# Patient Record
Sex: FEMALE | Race: WHITE | NOT HISPANIC OR LATINO | Employment: OTHER | URBAN - METROPOLITAN AREA
[De-identification: names, ages, dates, MRNs, and addresses within clinical notes are randomized per-mention and may not be internally consistent; named-entity substitution may affect disease eponyms.]

---

## 2018-12-31 ENCOUNTER — TELEPHONE (OUTPATIENT)
Dept: SURGERY | Facility: CLINIC | Age: 68
End: 2018-12-31

## 2018-12-31 DIAGNOSIS — Z86.0101 HX OF ADENOMATOUS POLYP OF COLON: Primary | ICD-10-CM

## 2018-12-31 NOTE — TELEPHONE ENCOUNTER
[facility] Call Center    Phone Message    May a detailed message be left on voicemail: yes    Reason for Call: Order(s): Other:   Reason for requested: Colonoscopy; last seen 11/2013  Date needed: Next available  Provider name: Dr. Amadou Barragan      Action Taken: Message routed to:  [facility]

## 2018-12-31 NOTE — TELEPHONE ENCOUNTER
Called patient regarding message below. Patient was not available, spoke to spouse Rosalio. Patient last colonoscopy 6/2013 with Dr. Barragan, recommendation to follow up 3 years. Patient is over due. Orders place in [facility]. Someone will call patient to schedule Colonoscopy.    Enoch RUBALCAVA LPN

## 2019-01-21 ENCOUNTER — TELEPHONE (OUTPATIENT)
Dept: GASTROENTEROLOGY | Facility: OUTPATIENT CENTER | Age: 69
End: 2019-01-21

## 2019-01-22 ENCOUNTER — TELEPHONE (OUTPATIENT)
Dept: GASTROENTEROLOGY | Facility: OUTPATIENT CENTER | Age: 69
End: 2019-01-22

## 2019-01-22 NOTE — TELEPHONE ENCOUNTER
Patient taking any blood thinners ? 81 mg aspirin    Heart disease ? Denies     Lung disease ? Denies       Sleep apnea ? Denies     Diabetic ? Denies     Kidney disease ? Denies     Dialysis ? N/a     Electronic implanted medical devices ? Denies     Are you taking any narcotic pain medication ?  No  What is your daily dosage ?    PTSD ? N/a     Prep instructions reviewed with patient ? Instructions, policy,MAC sedation plan reviewed. Advised patient to have someone stay with them post exam.    Pharmacy :    Indication for procedure :Hx of adenomatous polyp of colon [Z86.010    Referring provider : Self     Arrival Time : 2:30 PM

## 2019-01-28 ENCOUNTER — DOCUMENTATION ONLY (OUTPATIENT)
Dept: GASTROENTEROLOGY | Facility: OUTPATIENT CENTER | Age: 69
End: 2019-01-28
Payer: MEDICARE

## 2019-01-28 ENCOUNTER — TRANSFERRED RECORDS (OUTPATIENT)
Dept: HEALTH INFORMATION MANAGEMENT | Facility: CLINIC | Age: 69
End: 2019-01-28

## 2019-01-29 ENCOUNTER — TELEPHONE (OUTPATIENT)
Dept: GASTROENTEROLOGY | Facility: CLINIC | Age: 69
End: 2019-01-29

## 2019-02-25 ENCOUNTER — TELEPHONE (OUTPATIENT)
Dept: GASTROENTEROLOGY | Facility: OUTPATIENT CENTER | Age: 69
End: 2019-02-25

## 2019-03-04 ENCOUNTER — DOCUMENTATION ONLY (OUTPATIENT)
Dept: GASTROENTEROLOGY | Facility: OUTPATIENT CENTER | Age: 69
End: 2019-03-04
Payer: MEDICARE

## 2019-03-04 ENCOUNTER — TRANSFERRED RECORDS (OUTPATIENT)
Dept: HEALTH INFORMATION MANAGEMENT | Facility: CLINIC | Age: 69
End: 2019-03-04

## 2022-02-25 ENCOUNTER — TRANSCRIBE ORDERS (OUTPATIENT)
Dept: MULTI SPECIALTY CLINIC | Facility: CLINIC | Age: 72
End: 2022-02-25
Payer: MEDICARE

## 2022-02-25 DIAGNOSIS — M35.00 SJOGREN'S SYNDROME, WITH UNSPECIFIED ORGAN INVOLVEMENT (H): ICD-10-CM

## 2022-02-25 DIAGNOSIS — M35.1 MIXED CONNECTIVE TISSUE DISEASE (H): Primary | ICD-10-CM

## 2022-02-28 ENCOUNTER — TELEPHONE (OUTPATIENT)
Dept: MULTI SPECIALTY CLINIC | Facility: CLINIC | Age: 72
End: 2022-02-28
Payer: MEDICARE

## 2022-02-28 NOTE — TELEPHONE ENCOUNTER
[facility] Call Center    Phone Message    May a detailed message be left on voicemail: no     Reason for Call: Appointment Intake    Referring Provider Name: Dr. Manuelito Simpson  Diagnosis and/or Symptoms: Mixed connective tissue disease, Sjogren's syndrome, with unspecified organ involvement    Appt scheduled for 4/8, pt's  is hoping there's some way she could be seen earlier? He reports she has been experiencing severe pain-widespread across several areas of her body (hands are purple, feet are red/purple). Has not been able to sleep well,  reports that she has no quality of life. Says she has had this pain for some time, but recently it has worsened to the point of becoming unbearable.   Is ok with being seen by any provider, ok for either video or in-person visit.     Action Taken: Message routed to:  [facility]    Travel Screening: Not Applicable

## 2022-03-03 NOTE — TELEPHONE ENCOUNTER
Pt was given diagnosis of Sjogren's syndrome and UCTD in 2016 was seen in [facility] clinic, and by [facility].     Pt requesting to be seen again due to increasing pain in hands and feet.      Natalia Luna RN  Rheumatology Clinic

## 2022-03-08 NOTE — TELEPHONE ENCOUNTER
NOTES Status Details   OFFICE NOTE from referring provider Internal 02.11.2022 Manuelito Simpson MD     MEDICATION LIST Care Everywhere    LABS (Any and all labs)      Care Everywhere

## 2022-03-10 NOTE — TELEPHONE ENCOUNTER
"Called \"home\" and was unable to leave VM. Left VM on \"mobile\" to call back and inform patient of response from clinical staff.   "

## 2022-03-11 NOTE — TELEPHONE ENCOUNTER
Pt's spouse called yesterday evening afterhour and left a VM in Rheum scheduling VM.  Writer return the call and LMTCB to relay message below from [facility] Staff.    Natalia Luna, RN  Scheduling Adult Rheumatology        Pt can keep virtual appt, no one has anything sooner.

## 2022-04-08 ENCOUNTER — VIRTUAL VISIT (OUTPATIENT)
Dept: RHEUMATOLOGY | Facility: CLINIC | Age: 72
End: 2022-04-08
Attending: INTERNAL MEDICINE
Payer: MEDICARE

## 2022-04-08 ENCOUNTER — PRE VISIT (OUTPATIENT)
Dept: RHEUMATOLOGY | Facility: CLINIC | Age: 72
End: 2022-04-08
Payer: MEDICARE

## 2022-04-08 DIAGNOSIS — M35.00 SJOGREN'S SYNDROME, WITH UNSPECIFIED ORGAN INVOLVEMENT (H): Primary | ICD-10-CM

## 2022-04-08 DIAGNOSIS — M35.1 MIXED CONNECTIVE TISSUE DISEASE (H): ICD-10-CM

## 2022-04-08 DIAGNOSIS — Z11.59 ENCOUNTER FOR SCREENING FOR OTHER VIRAL DISEASES: ICD-10-CM

## 2022-04-08 PROCEDURE — 99205 OFFICE O/P NEW HI 60 MIN: CPT | Mod: 95 | Performed by: INTERNAL MEDICINE

## 2022-04-08 NOTE — PROGRESS NOTES
Outpatient Rheumatology Consultation  This visit was conducted via synchronous video visit due to the current COVID-19 crisis to reduce patient risk.  Verbal consent was obtained and is documented below.    Name: Anjali Chery    MRN [id number]   Today's date: 4/8/2022         Reason for consult: History of sjogrens syndrome and painful rash   Requesting physician: Manuelito Simpson             Assessment & Plan:   #DANIELA by IFA negative on 4/19/22 though previously DANIELA by EIA of 6.9 on 6/24/2015  #SSA >8 on 4/19/22 and >240 on 9/16/2021  #Severe dry mouth with gingival disease/carries/cracked teeth  #Synovitis/tenosynovitis of right wrist/hand 2014  #Progressive/severe pain with electrical shock sensation over palms/soles which has progressed to any area making contact with another surface    Very pleasant 72 year old female last seen in rheumatology in 2016 by Ubaldo Rojas/Calvin at that time for what was thought to be most consistent with sjogrens (had positive DANIELA by EIA of 6.9 then). Had an skin biopsy with epidermal nerve fiber density performed on 11/9/2015 which was 11.4 (which was mildly below the 5% cut off of 12.2 and so thought to be inconclusive at that time).  While some had been checked previously and negative, with this encounter I ordered ESR, CRP, CBC, CMP, SPEP, rheumatoid factor, CCP, SSB, Fajardo, RNP, cryoglobulins, TPO antibody, MPO, MS-3, hepatitis B/C/HIV/Quant gold, angiotensin-converting enzyme, TSH, CK, IgA levels, IgG levels, IgM level all of which were negative.  Her only abnormal serology on labs ordered with this encounter was her elevated SSA antibody above 8.  Her ongoing progressive symptoms since her last visit with rheumatology is that of her severe dry mouth with abrupt progression with gingival disease/caries/cracked teeth and her biggest complaint being that of her electrical shock sensation associated with red rash on the palms and soles. Taken together, I do wonder  whether her biggest complain of skin pain/paresthesias etc is related to a small fiber neuropathy (I do note that the last time she had an EMG done was in 2015 and the records seem to have jammed when scanned so the pages are overlapped. From what I can read, apart from carpal tunnel, there is an early neuropathy of the lower extremities- but unable to make out much more)related to sjogrens, though I have not seen this manifestation before. We Have asked for the expertise of dermatology evaluation who may decide on another bx if they feel is appropriate given that at the time of her last biopsy her symptoms were far less severe/pronounced. If consistent with small fiber neuropathy, would likely recommend IVIG to the patient.     Chilango Dick MD  Rheumatology     I spent a total of 75 minutes on the date of service on chart review, patient video encounter, documentation, .     Subjective:   Biggest complaint at this time is ongoing shocking/electrical sensation. 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 Occurred in both hands. Waited for a few years to have carpal tunnel release and had lasting damage after release to median nerve. Post surgical period complicated by ongoing drainage.     Her current symptoms are that of pain/pressure in her digits. With feet on the floor and sitting, her hand and foot symptoms are worse. When standing and walking, can be either equal or the same. She has the sensation that she is being shocked or exposed to electricity. Previously when lying down, will put her feet up/ put arms up in the air and would be able to  Reduce symptoms. She has the sensation that she is being burned by the sun/radiation.     Severe dry mouth. Worsens with acute worsening of her dry mouth with her acute worsening of her foot/hand symptoms. Has started to crack/broken teeth. Prior to 2018 had excellent dental care. Then abprupt worsening of gingival disease and carries. No  history of symptoms consistent of raynauds. No chest pain/shortness of breath. Has been walking 1.5-3 miles without chest/breathing issues. Once a week in summer and winter, will have some dried blood in tissue paper when blowing her nose. No fevers/chills/night sweats. Is vaccinated against COVID. Weight has been stable. No abdominal pain/nausea/diarrhea. No blood in her stool. No change in urinary symptoms.  Had previously been tried on methotrexate along with prednisone.  Did not notice any improvement and so discontinued both prior to [facility] with Ubaldo Rojas/Calvin.  No malar rash.  Other than the rash on her palms and soles and other pressure areas she denies rash elsewhere.  No dysphagia.  No change in vision or hearing.  Has residual bilateral median neuropathy.  No history of DVT or PE.  No history of inflammatory eye disease.  No history of oral or genital ulcers.  No history of symptoms consistent with dactylitis.  No history of symptoms consistent with sclerodactyly.  A plan at the last time of her appointment with rheumatology here was to start hydroxychloroquine though she did not continue on this due to developing nausea.  Her review of systems is otherwise negative.    Past Medical History  Past Medical History:   Diagnosis Date     Back disorder      History of colonic polyps      Hyperlipidemia      Median neuropathy, 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     Peripheral vascular disease (H)      Rectal prolapse      S/P bilateral vein stripping (1975) 9/2/2015     Sjogren's syndrome (H) 6/24/2015     Venous insufficiency 9/2/2015       Past Surgical History  Past Surgical History:   Procedure Laterality Date     APPENDECTOMY       CHOLECYSTECTOMY       COLECTOMY       COLONOSCOPY      multiple     rectal prolapse repair[       rectocele/enterocele repair[       RECTOSIGMOIDECTOMY PERINEAL  11/13/2013    Procedure: RECTOSIGMOIDECTOMY PERINEAL;  [facility] Procedure, Exam under anesthesia;   Surgeon: Amadou Barragan MD;  Location: [facility] OR     removal of tubes and ovaries[       sling procedure[       tumor removed right ilium[       vaginal prolapse repair[         Medications  Current Outpatient Medications   Medication     AMITRIPTYLINE HCL PO     aspirin 81 MG tablet     baclofen (LIORESAL) 20 MG tablet     butalbital-acetaminophen-caffeine (FIORICET, ESGIC) -40 MG per tablet     Calcium-Magnesium-Zinc 333-133-5 MG TABS     Cholecalciferol (VITAMIN D3 PO)     Coenzyme Q10 (CO Q 10 PO)     COMPRESSION STOCKINGS     Cyanocobalamin (B-12 PO)     cyclobenzaprine (FLEXERIL) 10 MG tablet     fish oil-omega-3 fatty acids (FISH OIL) 1000 MG capsule     Flaxseed, Linseed, (FLAXSEED OIL PO)     folic acid (FOLVITE) 400 MCG tablet     GARLIC PO     Lutein 20 MG CAPS     senna-docusate (SENOKOT-S;PERICOLACE) 8.6-50 MG per tablet     UNABLE TO FIND     zolpidem (AMBIEN) 10 MG tablet     No current facility-administered medications for this visit.       Allergies   Allergies   Allergen Reactions     Plaquenil [Hydroxychloroquine] Nausea       Family History: Has a thyroid disease in her family.  Brother with a history of eosinophilic fasciitis.    Social History: .  No history of tobacco use or alcohol use.  Rare if ever alcohol use.      Objective:   Physical exam:   No vitals for this video visit. Vitals reviewed in Epic.  GEN: Sitting up at table. NAD, her  is with her  HEENT: no facial rash, sclera clear, no inflammatory nose/ external ear changes  CV: no upper extremity dependent edema  Pulm: speaking in full sentences, no cough, no audible wheezing, no use of accessory muscles  Skin: Red, what appears to be thick, skin changes over her palms and soles.  Also over the extensor surface of her distal elbows extending into her forearms.  MSK: full active ROM of bilateral shoulders, elbows, wrists, MCPs, PIPs, DIPs. Can make full fist though does not look particularly tight.  No obvious  dactylitis.  No obvious redness or swelling of her MCPs or PIPs though difficult to assess with the redness over her palms and digits on the palmar aspect    Labs:  WBC   Date Value Ref Range Status   2016 7.9 4.0 - 11.0 10e9/L Final     Hemoglobin   Date Value Ref Range Status   2016 11.9 11.7 - 15.7 g/dL Final     Platelet Count   Date Value Ref Range Status   2016 206 150 - 450 10e9/L Final     Creatinine   Date Value Ref Range Status   2016 0.74 0.52 - 1.04 mg/dL Final     No results found for: ALKPHOS  No results found for: AST  No results found for: ALT  Sed Rate   Date Value Ref Range Status   2016 93 (H) 0 - 30 mm/h Final     CRP Inflammation   Date Value Ref Range Status   2016 <2.9 0.0 - 8.0 mg/L Final     UA RESULTS:  No results for input(s): COLOR, APPEARANCE, URINEGLC, URINEBILI, URINEKETONE, SG, UBLD, URINEPH, PROTEIN, UROBILINOGEN, NITRITE, LEUKEST, RBCU, WBCU in the last 32613 hours.   DANIELA Screen by EIA   Date Value Ref Range Status   2015 6.9 (H) <1.0 Final     Comment:     Interpretation:  Positive     DNA-ds   Date Value Ref Range Status   10/09/2015 <15  Interpretation:  Negative   0 - 29 IU/mL Final     RNP Antibody IgG   Date Value Ref Range Status   10/09/2015  0.0 - 0.9 AI Final    <0.2  Negative   Antibody index (AI) values reflect qualitative changes in antibody   concentration that cannot be directly associated with clinical condition or   disease state.       Rheumatoid Factor   Date Value Ref Range Status   2015 <20 <20 IU/mL Final       Imagin/10/2014: MRI right hand and wrist: Extensive synovitis and tenosynovitis

## 2022-04-08 NOTE — LETTER
4/8/2022       RE: Anjali Chery  [address] [place] [address]     Dear Colleague,    Thank you for referring your patient, Anjali Chery, to the [facility] RHEUMATOLOG[facility] [place] [facility]. Please see a copy of my visit note below.       Outpatient Rheumatology Consultation  This visit was conducted via synchronous video visit due to the current COVID-19 crisis to reduce patient risk.  Verbal consent was obtained and is documented below.    Name: Anjali Chery    MRN [id number]   Today's date: 4/8/2022         Reason for consult: History of sjogrens syndrome and painful rash   Requesting physician: Manuelito Simpson             Assessment & Plan:   #DANIELA by IFA negative on 4/19/22 though previously DANIELA by EIA of 6.9 on 6/24/2015  #SSA >8 on 4/19/22 and >240 on 9/16/2021  #Severe dry mouth with gingival disease/carries/cracked teeth  #Synovitis/tenosynovitis of right wrist/hand 2014  #Progressive/severe pain with electrical shock sensation over palms/soles which has progressed to any area making contact with another surface    Very pleasant 72 year old female last seen in rheumatology in 2016 by Ubaldo Rojas/Calvin at that time for what was thought to be most consistent with sjogrens (had positive DANIELA by EIA of 6.9 then). Had an skin biopsy with epidermal nerve fiber density performed on 11/9/2015 which was 11.4 (which was mildly below the 5% cut off of 12.2 and so thought to be inconclusive at that time).  While some had been checked previously and negative, with this encounter I ordered ESR, CRP, CBC, CMP, SPEP, rheumatoid factor, CCP, SSB, Fajardo, RNP, cryoglobulins, TPO antibody, MPO, [place]-3, hepatitis B/C/HIV/Quant gold, angiotensin-converting enzyme, TSH, CK, IgA levels, IgG levels, IgM level all of which were negative.  Her only abnormal serology on labs ordered with this encounter was her elevated SSA  antibody above 8.  Her ongoing progressive symptoms since her last visit with rheumatology is that of her severe dry mouth with abrupt progression with gingival disease/caries/cracked teeth and her biggest complaint being that of her electrical shock sensation associated with red rash on the palms and soles. Taken together, I do wonder whether her biggest complain of skin pain/paresthesias etc is related to a small fiber neuropathy (I do note that the last time she had an EMG done was in 2015 and the records seem to have jammed when scanned so the pages are overlapped. From what I can read, apart from carpal tunnel, there is an early neuropathy of the lower extremities- but unable to make out much more)related to sjogrens, though I have not seen this manifestation before. We Have asked for the expertise of dermatology evaluation who may decide on another bx if they feel is appropriate given that at the time of her last biopsy her symptoms were far less severe/pronounced. If consistent with small fiber neuropathy, would likely recommend IVIG to the patient.     Chilango Dick MD  Rheumatology     I spent a total of 75 minutes on the date of service on chart review, patient video encounter, documentation, .     Subjective:   Biggest complaint at this time is ongoing shocking/electrical sensation. 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 Occurred in both hands. Waited for a few years to have carpal tunnel release and had lasting damage after release to median nerve. Post surgical period complicated by ongoing drainage.     Her current symptoms are that of pain/pressure in her digits. With feet on the floor and sitting, her hand and foot symptoms are worse. When standing and walking, can be either equal or the same. She has the sensation that she is being shocked or exposed to electricity. Previously when lying down, will put her feet up/ put arms up in the air and would be able to  Reduce  symptoms. She has the sensation that she is being burned by the sun/radiation.     Severe dry mouth. Worsens with acute worsening of her dry mouth with her acute worsening of her foot/hand symptoms. Has started to crack/broken teeth. Prior to 2018 had excellent dental care. Then abprupt worsening of gingival disease and carries. No history of symptoms consistent of raynauds. No chest pain/shortness of breath. Has been walking 1.5-3 miles without chest/breathing issues. Once a week in summer and winter, will have some dried blood in tissue paper when blowing her nose. No fevers/chills/night sweats. Is vaccinated against COVID. Weight has been stable. No abdominal pain/nausea/diarrhea. No blood in her stool. No change in urinary symptoms.  Had previously been tried on methotrexate along with prednisone.  Did not notice any improvement and so discontinued both prior to seeing rheumatology [facility] [employer] with Ubaldo Rojas/Clavin.  No malar rash.  Other than the rash on her palms and soles and other pressure areas she denies rash elsewhere.  No dysphagia.  No change in vision or hearing.  Has residual bilateral median neuropathy.  No history of DVT or PE.  No history of inflammatory eye disease.  No history of oral or genital ulcers.  No history of symptoms consistent with dactylitis.  No history of symptoms consistent with sclerodactyly.  A plan at the last time of her appointment with rheumatology here was to start hydroxychloroquine though she did not continue on this due to developing nausea.  Her review of systems is otherwise negative.    Past Medical History  Past Medical History:   Diagnosis Date     Back disorder      History of colonic polyps      Hyperlipidemia      Median neuropathy, 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     Peripheral vascular disease (H)      Rectal prolapse      S/P bilateral vein stripping (1975) 9/2/2015     Sjogren's syndrome (H) 6/24/2015     Venous insufficiency 9/2/2015       Past Surgical  History  Past Surgical History:   Procedure Laterality Date     APPENDECTOMY       CHOLECYSTECTOMY       COLECTOMY       COLONOSCOPY      multiple     rectal prolapse repair[       rectocele/enterocele repair[       RECTOSIGMOIDECTOMY PERINEAL  11/13/2013    Procedure: RECTOSIGMOIDECTOMY PERINEAL;  [facility] Procedure, Exam under anesthesia;  Surgeon: Amadou Barragan MD;  Location: [facility] OR     removal of tubes and ovaries[       sling procedure[       tumor removed right ilium[       vaginal prolapse repair[         Medications  Current Outpatient Medications   Medication     AMITRIPTYLINE HCL PO     aspirin 81 MG tablet     baclofen (LIORESAL) 20 MG tablet     butalbital-acetaminophen-caffeine (FIORICET, ESGIC) -40 MG per tablet     Calcium-Magnesium-Zinc 333-133-5 MG TABS     Cholecalciferol (VITAMIN D3 PO)     Coenzyme Q10 (CO Q 10 PO)     COMPRESSION STOCKINGS     Cyanocobalamin (B-12 PO)     cyclobenzaprine (FLEXERIL) 10 MG tablet     fish oil-omega-3 fatty acids (FISH OIL) 1000 MG capsule     Flaxseed, Linseed, (FLAXSEED OIL PO)     folic acid (FOLVITE) 400 MCG tablet     GARLIC PO     Lutein 20 MG CAPS     senna-docusate (SENOKOT-S;PERICOLACE) 8.6-50 MG per tablet     UNABLE TO FIND     zolpidem (AMBIEN) 10 MG tablet     No current facility-administered medications for this visit.       Allergies   Allergies   Allergen Reactions     Plaquenil [Hydroxychloroquine] Nausea       Family History: Has a thyroid disease in her family.  Brother with a history of eosinophilic fasciitis.    Social History: .  No history of tobacco use or alcohol use.  Rare if ever alcohol use.      Objective:   Physical exam:   No vitals for this video visit. Vitals reviewed in Epic.  GEN: Sitting up at table. NAD, her  is with her  HEENT: no facial rash, sclera clear, no inflammatory nose/ external ear changes  CV: no upper extremity dependent edema  Pulm: speaking in full sentences, no cough, no audible wheezing, no  use of accessory muscles  Skin: Red, what appears to be thick, skin changes over her palms and soles.  Also over the extensor surface of her distal elbows extending into her forearms.  MSK: full active ROM of bilateral shoulders, elbows, wrists, MCPs, PIPs, DIPs. Can make full fist though does not look particularly tight.  No obvious dactylitis.  No obvious redness or swelling of her MCPs or PIPs though difficult to assess with the redness over her palms and digits on the palmar aspect    Labs:  WBC   Date Value Ref Range Status   2016 7.9 4.0 - 11.0 10e9/L Final     Hemoglobin   Date Value Ref Range Status   2016 11.9 11.7 - 15.7 g/dL Final     Platelet Count   Date Value Ref Range Status   2016 206 150 - 450 10e9/L Final     Creatinine   Date Value Ref Range Status   2016 0.74 0.52 - 1.04 mg/dL Final     No results found for: ALKPHOS  No results found for: AST  No results found for: ALT  Sed Rate   Date Value Ref Range Status   2016 93 (H) 0 - 30 mm/h Final     CRP Inflammation   Date Value Ref Range Status   2016 <2.9 0.0 - 8.0 mg/L Final     UA RESULTS:  No results for input(s): COLOR, APPEARANCE, URINEGLC, URINEBILI, URINEKETONE, SG, UBLD, URINEPH, PROTEIN, UROBILINOGEN, NITRITE, LEUKEST, RBCU, WBCU in the last 46222 hours.   DANIELA Screen by EIA   Date Value Ref Range Status   2015 6.9 (H) <1.0 Final     Comment:     Interpretation:  Positive     DNA-ds   Date Value Ref Range Status   10/09/2015 <15  Interpretation:  Negative   0 - 29 IU/mL Final     RNP Antibody IgG   Date Value Ref Range Status   10/09/2015  0.0 - 0.9 AI Final    <0.2  Negative   Antibody index (AI) values reflect qualitative changes in antibody   concentration that cannot be directly associated with clinical condition or   disease state.       Rheumatoid Factor   Date Value Ref Range Status   2015 <20 <20 IU/mL Final       Imagin/10/2014: MRI right hand and wrist: Extensive synovitis and  tenosynovitis              Anjali BERNA Paceleanna  is being evaluated via a billable video visit.      How would you like to obtain your AVS? Mail a copy  For the video visit, send the invitation by: Text to cell phone: [phone]  Will anyone else be joining your video visit? No      Video start time 11:11AM  Video end time 12:25PM  Platform used: Angel Luis Dick MD  Rheumatology

## 2022-04-08 NOTE — Clinical Note
Syed Fisher, could you please give cindy a call and let her know that her labs do not identify any new antibodies other than those that were previously positive- specifically her SSA antibody which is associated with sjogrens. She has her appointment with Dr Solis on 4/29 (thank you for organizing for us). Once eval for cutaneous manifestations is complete, will see her back for follow-up (so can tentatively put something on hold for her and forward to scheduling team for for mid/late may? Can use PAOs)  Thanks

## 2022-04-08 NOTE — PROGRESS NOTES
Anjali Chery  is being evaluated via a billable video visit.      How would you like to obtain your AVS? Mail a copy  For the video visit, send the invitation by: Text to cell phone: [phone]  Will anyone else be joining your video visit? No      Video start time 11:11AM  Video end time 12:25PM  Platform used: Angel Luis Dick MD  Rheumatology

## 2022-04-13 ENCOUNTER — TELEPHONE (OUTPATIENT)
Dept: RHEUMATOLOGY | Facility: CLINIC | Age: 72
End: 2022-04-13
Payer: MEDICARE

## 2022-04-13 NOTE — TELEPHONE ENCOUNTER
[facility] Call Center    Phone Message    May a detailed message be left on voicemail: yes     Reason for Call: Other: Please fax lab orders to [facility] in [facility]. Fax number [phone]. Please call pt at [phone] when lab orders are faxed to [facility].      Action Taken: Message routed to:  [facility] ([facility] Adult Rheumatology    Travel Screening: Not Applicable

## 2022-04-13 NOTE — TELEPHONE ENCOUNTER
Dr. Dick told the patient that someone would call her to schedule a dermatology appt and she hasn't heard anything (I do not see a active derm referral).    Patient also needs lab orders sent to [facility] and will call back with a fax number.

## 2022-04-18 NOTE — TELEPHONE ENCOUNTER
Patient scheduled first available dermatology appointment, which is on July 18th. Patient's , Rosalio, thinks Dr. Dick wanted sooner appointment than that. Please call to discuss whether this appointment needs to be sooner or not. Dermatology dept recommended that Dr. Dick call if appt is urgent.

## 2022-04-20 ENCOUNTER — TELEPHONE (OUTPATIENT)
Dept: RHEUMATOLOGY | Facility: CLINIC | Age: 72
End: 2022-04-20
Payer: MEDICARE

## 2022-04-20 NOTE — TELEPHONE ENCOUNTER
Moshe would like a call back from Natalia to discuss lab work that was done at [facility].  [facility] did sent a copy to Dr. Dick. Also [facility] told them they were unable to do the sed rate and CBC because of clotting.

## 2022-04-20 NOTE — TELEPHONE ENCOUNTER
Discussed with Dr. Dick, he would like her to see one of the rheum dermatologist.  Have called and talked with pt's , they will do a virtual visit with Dr. Solis next Friday, in order to get her seen sooner, as the next available in person is June.      They will sign up for my chart so that they can upload photos of the rash.    Natalia Luna RN  Rheumatology Clinic

## 2022-04-21 NOTE — TELEPHONE ENCOUNTER
Chilango Dick MD  You; Natalia Luna, RN 5 hours ago (7:46 AM)     TAO    Thanks, Rachel. Can you please call them and let them know that only about half are back so far. The rest should be back by Monday. I will set a reminder to give them a call on Monday morning. If they don't hear from me by noon on Monday to please call in again to remind me...     Thanks      Spoke to Moshe and let him know the above information. Moshe requested a phone call later in the afternoon would work better, Moshe requested after 4PM. Patient is going to get the missed labs done before Monday. Moshe also wanted to update the phone number to [phone], this has been done.     Rachel Kowalski CMA   4/21/2022 1:51 PM

## 2022-04-26 ENCOUNTER — TELEPHONE (OUTPATIENT)
Dept: DERMATOLOGY | Facility: CLINIC | Age: 72
End: 2022-04-26
Payer: MEDICARE

## 2022-04-26 NOTE — TELEPHONE ENCOUNTER
Kapil, MD Cheryl Kiser, Natalia, RO  Hi Natalia, could you please give cindy a call and let her know that her labs do not identify any new antibodies other than those that were previously positive- specifically her SSA antibody which is associated with sjogrens. She has her appointment with Dr Solis on 4/29 (thank you for organizing for us). Once eval for cutaneous manifestations is complete, will see her back for follow-up (so can tentatively put something on hold for her and forward to scheduling team for for mid/late may? Can use PAOs)     Thanks     Called and updated pt's .  She will attend appt with Dr. Solis and then they will make a follow up appt with Dr. Dick.    Natalia Luna RN  Rheumatology Clinic

## 2022-04-26 NOTE — TELEPHONE ENCOUNTER
[facility] Call Center    Phone Message    May a detailed message be left on voicemail: yes     Reason for Call: Other: Pts spouse Moshe called, not familiar with how virtual appointments work. Would like link and instructions sent to his email:[email or web address]. She has appt with Dr. Solis 4/29. Thanks     Action Taken: Message routed to:  [facility] Dermatology    Travel Screening: Not Applicable

## 2022-04-28 ENCOUNTER — TELEPHONE (OUTPATIENT)
Dept: DERMATOLOGY | Facility: CLINIC | Age: 72
End: 2022-04-28
Payer: MEDICARE

## 2022-04-28 NOTE — TELEPHONE ENCOUNTER
Writer talked to Moshe and gave him the following email address to send photos:  [email or web address]    He is going to send photos for Anjali's visit tomorrow with Dr. Solis.    Please send the link for the video visit to the following email address:    [email or web address]    EFRAIN Johansen

## 2022-04-28 NOTE — TELEPHONE ENCOUNTER
FUTURE VISIT INFORMATION      FUTURE VISIT INFORMATION:    Date: 4.29.22    Time: 11:00    Location: Video  REFERRAL INFORMATION:    Referring provider:  Kapil    Referring providers clinic:  Rheumatology    Reason for visit/diagnosis  palmar eruptions, pt would like virtual appointment link emailed to: [email or web address] before appointment    RECORDS REQUESTED FROM:       Clinic name Comments Records Status Photos Status   Rheum 4.8.22  [facility] All emailed photos are in Dr. Solis's rightx med records tab

## 2022-04-28 NOTE — TELEPHONE ENCOUNTER
[facility] Call Center    Phone Message    May a detailed message be left on voicemail: yes     Reason for Call: Pt's  sent pics for Appt 04/29 in the [facility] e-mail. Please check the e - mail and make sure that it went through okay and they look good. Please call Pt's  Rosalio back if any concerns. Thanks     Action Taken: Message routed to:  [facility] ([facility]): Derm    Travel Screening: Not Applicable

## 2022-04-29 ENCOUNTER — PRE VISIT (OUTPATIENT)
Dept: DERMATOLOGY | Facility: CLINIC | Age: 72
End: 2022-04-29
Payer: MEDICARE

## 2022-04-29 ENCOUNTER — VIRTUAL VISIT (OUTPATIENT)
Dept: DERMATOLOGY | Facility: CLINIC | Age: 72
End: 2022-04-29
Payer: MEDICARE

## 2022-04-29 DIAGNOSIS — G62.9 NEUROPATHY: Primary | ICD-10-CM

## 2022-04-29 DIAGNOSIS — M35.00 SJOGREN'S SYNDROME, WITH UNSPECIFIED ORGAN INVOLVEMENT (H): ICD-10-CM

## 2022-04-29 PROCEDURE — 99203 OFFICE O/P NEW LOW 30 MIN: CPT | Mod: 95 | Performed by: DERMATOLOGY

## 2022-04-29 RX ORDER — GABAPENTIN 100 MG/1
100 CAPSULE ORAL 3 TIMES DAILY
Qty: 90 CAPSULE | Refills: 3 | Status: SHIPPED | OUTPATIENT
Start: 2022-04-29 | End: 2022-09-22 | Stop reason: ALTCHOICE

## 2022-04-29 NOTE — PROGRESS NOTES
[facility] Dermatology Note  Encounter Date: Apr 29, 2022  Video (invitation sent by email). Location of teledermatologist: [facility] [place]. Start time: 11:00. End time: 12:00.    Dermatology Problem List:    1. Pain and redness of feet and dorsal hands, chronic, worsening  - Unclear etiology; ddx includes small fiber neuropathy, complex regional pain syndrome, erythromelalgia  - Initial onset ~2013  - Current tx: start gabapentin 100 mg nightly, amitriptyline 10 mg nightly, CeraVe with pramoxine, aloe vera  - Past tx: triamcinolone 0.1% oint, mometasone  - Future: repeat biopsy/EM     2. Suspected Sjogren syndrome with history of inflammatory arthritis and chronic pain  - Follows rheum (first seen ~1999)  - Past tx: MTX (~2014), prednisone, Plaquenil (several years, stopped due to lack of efficacy and also nausea)    ____________________________________________    Assessment & Plan:     1. Pain and redness of feet and dorsal hands, chronic, worsening  2. Suspected Sjogren syndrome with history of inflammatory arthritis and chronic pain  Unclear etiology of bilateral hand/feet redness and pain, in setting of suspected Sjogren syndrome (complex rheum history with unclear constellation of findings, first seen by rheum back in 1999). Of note, she reports today that she does not have palmar involvement, rather it affects the dorsal hands. Agree with excellent rheum evaluation and suspicion for possible small fiber neuropathy (this was previously considered back in 2015 as well). Could also consider complex regional pain syndrome and erythromelalgia. Less likely intrinsic skin inflammation and less suspicion for palmoplantar keratoderma. Topical mometasone and triamcinolone has not been very effective.   - Discussed possible etiologies and treatment options, including risks/benefits of gabapentin/pregabalin (previously tried ~2014), SNRIs, or increasing amitriptyline  dose (which she already takes for sleep)  - Following discussion, will start gabapentin 100 mg nightly, with plan to increase to TID in the future weeks  - Recommend trial of CeraVe with pramoxine   - Continue amitriptyline as previously prescribed  - Given limitations of virtual visit, will schedule in-person evaluation on 5/5/22 at 3:50 PM (with consideration for repeat biopsy and EMG)    Procedures Performed:    None    Follow-up: in-person evaluation 5/5/22 at 3:50 PM    Staff and Resident:     Staffed with Dr. Irma Davis MD (PGY-2)   Dermatology Resident    Staff Physician Comments:   I evaluated any available patient photographs with the resident and I edited the assessment and plan as documented in the note. I was present on the line and participated in the entire video visit.    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 )    HPI:  Ms. Anjali Chery is a(n) 72 year old female with history of suspected Sjogren syndrome who presents today as a new patient for worsening chronic pain and redness of hands and feet. Patient referred from rheum for consideration of potential biopsy & rheum also suspecting possible small fiber neuropathy, for which they'd likely recommend IVIG    - Today, patient reports she first noticed some of these symptoms back in 2013.  - Her feet are constantly painful with burning sensation. Not particularly itchy. They are often very bright red, but sometimes turns to a darker bluish color. States the redness is there nearly every day, but sometimes is lighter in color or not as noticeable  - Her hands are more variable and the symptoms come and go. She clarifies today that the involvement occurs on the back/dorsum of her hands, and not the palms. Describes redness and  dryness that occurs.  - Currently using CeraVe Rough & Bumpy lotion daily. Also using aloe vera which has a cooling effect   - Previously tried mometasone and triamcinolone 0.1% ointment without noticeable improvement    Labs Reviewed:  Recent rheum and autoimmune labs 4/19/22 all negative except for elevated SSA (>8)    Physical Exam:  Vitals: There were no vitals taken for this visit.  SKIN:  No teledermatology photos were available. Very limited exam of hands and feet via video showed:   - Hands without obvious lesions  - Significant erythema involving most of bilateral dorsal and plantar feet, sparing only the proximal mid dorsal region of the feet  - No other lesions of concern on areas examined.     Medications:  Current Outpatient Medications   Medication     AMITRIPTYLINE HCL PO     aspirin 81 MG tablet     baclofen (LIORESAL) 20 MG tablet     butalbital-acetaminophen-caffeine (FIORICET, ESGIC) -40 MG per tablet     Calcium-Magnesium-Zinc 333-133-5 MG TABS     Cholecalciferol (VITAMIN D3 PO)     Coenzyme Q10 (CO Q 10 PO)     COMPRESSION STOCKINGS     Cyanocobalamin (B-12 PO)     cyclobenzaprine (FLEXERIL) 10 MG tablet     fish oil-omega-3 fatty acids 1000 MG capsule     Flaxseed, Linseed, (FLAXSEED OIL PO)     folic acid (FOLVITE) 400 MCG tablet     GARLIC PO     Lutein 20 MG CAPS     senna-docusate (SENOKOT-S;PERICOLACE) 8.6-50 MG per tablet     UNABLE TO FIND     zolpidem (AMBIEN) 10 MG tablet     No current facility-administered medications for this visit.      Past Medical/Surgical History:   Patient Active Problem List   Diagnosis     Rectal prolapse     Median neuropathy, left     Median neuropathy, right     Sjogren's syndrome (H)     Hx of bilateral carpal tunnel repair     S/P bilateral vein stripping (1975)     Venous insufficiency     Past Medical History:   Diagnosis Date     Back disorder      History of colonic polyps      Hyperlipidemia      Median neuropathy, 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      Peripheral vascular disease (H)      Rectal prolapse      S/P bilateral vein stripping (1975) 9/2/2015     Sjogren's syndrome (H) 6/24/2015     Venous insufficiency 9/2/2015       CC Manuelito Simpson  [address] [place] [address] on close of this encounter.

## 2022-04-29 NOTE — LETTER
4/29/2022       RE: Anjali Chery  [address] [place] [address]     Dear Colleague,    Thank you for referring your patient, Anjali Chery, to the [facility] [place] at [facility]. Please see a copy of my visit note below.    [facility] Dermatology Note  Encounter Date: Apr 29, 2022  Video (invitation sent by email). Location of teledermatologist: [facility] [place]. Start time: 11:00. End time: 12:00.    Dermatology Problem List:    1. Pain and redness of feet and dorsal hands, chronic, worsening  - Unclear etiology; ddx includes small fiber neuropathy, complex regional pain syndrome, erythromelalgia  - Initial onset ~2013  - Current tx: start gabapentin 100 mg nightly, amitriptyline 10 mg nightly, CeraVe with pramoxine, aloe vera  - Past tx: triamcinolone 0.1% oint, mometasone  - Future: repeat biopsy/EM     2. Suspected Sjogren syndrome with history of inflammatory arthritis and chronic pain  - Follows rheum (first seen ~1999)  - Past tx: MTX (~2014), prednisone, Plaquenil (several years, stopped due to lack of efficacy and also nausea)    ____________________________________________    Assessment & Plan:     1. Pain and redness of feet and dorsal hands, chronic, worsening  2. Suspected Sjogren syndrome with history of inflammatory arthritis and chronic pain  Unclear etiology of bilateral hand/feet redness and pain, in setting of suspected Sjogren syndrome (complex rheum history with unclear constellation of findings, first seen by rheum back in 1999). Of note, she reports today that she does not have palmar involvement, rather it affects the dorsal hands. Agree with excellent rheum evaluation and suspicion for possible small fiber neuropathy (this was previously considered back in 2015 as well). Could also consider complex regional pain syndrome and  erythromelalgia. Less likely intrinsic skin inflammation and less suspicion for palmoplantar keratoderma. Topical mometasone and triamcinolone has not been very effective.   - Discussed possible etiologies and treatment options, including risks/benefits of gabapentin/pregabalin (previously tried ~2014), SNRIs, or increasing amitriptyline dose (which she already takes for sleep)  - Following discussion, will start gabapentin 100 mg nightly, with plan to increase to TID in the future weeks  - Recommend trial of CeraVe with pramoxine   - Continue amitriptyline as previously prescribed  - Given limitations of virtual visit, will schedule in-person evaluation on 5/5/22 at 3:50 PM (with consideration for repeat biopsy and EMG)    Procedures Performed:    None    Follow-up: in-person evaluation 5/5/22 at 3:50 PM    Staff and Resident:     Staffed with Dr. Irma Davis MD (PGY-2)   Dermatology Resident    Staff Physician Comments:   I evaluated any available patient photographs with the resident and I edited the assessment and plan as documented in the note. I was present on the line and participated in the entire video visit.    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 )    HPI:  Ms. Anjali Chery is a(n) 72 year old female with history of suspected Sjogren syndrome who presents today as a new patient for worsening chronic pain and redness of hands and feet. Patient referred from rheum for consideration of potential biopsy & rheum also suspecting possible small fiber neuropathy, for which they'd likely recommend IVIG    - Today, patient reports she first noticed some of these symptoms back in 2013.  - Her feet are constantly painful with burning sensation. Not particularly itchy. They are often very  bright red, but sometimes turns to a darker bluish color. States the redness is there nearly every day, but sometimes is lighter in color or not as noticeable  - Her hands are more variable and the symptoms come and go. She clarifies today that the involvement occurs on the back/dorsum of her hands, and not the palms. Describes redness and dryness that occurs.  - Currently using CeraVe Rough & Bumpy lotion daily. Also using aloe vera which has a cooling effect   - Previously tried mometasone and triamcinolone 0.1% ointment without noticeable improvement    Labs Reviewed:  Recent rheum and autoimmune labs 4/19/22 all negative except for elevated SSA (>8)    Physical Exam:  Vitals: There were no vitals taken for this visit.  SKIN:  No teledermatology photos were available. Very limited exam of hands and feet via video showed:   - Hands without obvious lesions  - Significant erythema involving most of bilateral dorsal and plantar feet, sparing only the proximal mid dorsal region of the feet  - No other lesions of concern on areas examined.     Medications:  Current Outpatient Medications   Medication     AMITRIPTYLINE HCL PO     aspirin 81 MG tablet     baclofen (LIORESAL) 20 MG tablet     butalbital-acetaminophen-caffeine (FIORICET, ESGIC) -40 MG per tablet     Calcium-Magnesium-Zinc 333-133-5 MG TABS     Cholecalciferol (VITAMIN D3 PO)     Coenzyme Q10 (CO Q 10 PO)     COMPRESSION STOCKINGS     Cyanocobalamin (B-12 PO)     cyclobenzaprine (FLEXERIL) 10 MG tablet     fish oil-omega-3 fatty acids 1000 MG capsule     Flaxseed, Linseed, (FLAXSEED OIL PO)     folic acid (FOLVITE) 400 MCG tablet     GARLIC PO     Lutein 20 MG CAPS     senna-docusate (SENOKOT-S;PERICOLACE) 8.6-50 MG per tablet     UNABLE TO FIND     zolpidem (AMBIEN) 10 MG tablet     No current facility-administered medications for this visit.      Past Medical/Surgical History:   Patient Active Problem List   Diagnosis     Rectal prolapse     Median  neuropathy, left     Median neuropathy, right     Sjogren's syndrome (H)     Hx of bilateral carpal tunnel repair     S/P bilateral vein stripping (1975)     Venous insufficiency     Past Medical History:   Diagnosis Date     Back disorder      History of colonic polyps      Hyperlipidemia      Median neuropathy, 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     Peripheral vascular disease (H)      Rectal prolapse      S/P bilateral vein stripping (1975) 9/2/2015     Sjogren's syndrome (H) 6/24/2015     Venous insufficiency 9/2/2015       CC Manuelito Simpson  [address]             [place] [phone] on close of this encounter.

## 2022-05-05 ENCOUNTER — OFFICE VISIT (OUTPATIENT)
Dept: DERMATOLOGY | Facility: CLINIC | Age: 72
End: 2022-05-05
Payer: MEDICARE

## 2022-05-05 DIAGNOSIS — L85.9 HYPERKERATOSIS: ICD-10-CM

## 2022-05-05 DIAGNOSIS — G62.9 NEUROPATHY: Primary | ICD-10-CM

## 2022-05-05 PROCEDURE — 99215 OFFICE O/P EST HI 40 MIN: CPT | Performed by: DERMATOLOGY

## 2022-05-05 RX ORDER — GABAPENTIN 300 MG/1
300 CAPSULE ORAL 3 TIMES DAILY
Qty: 90 CAPSULE | Refills: 3 | Status: SHIPPED | OUTPATIENT
Start: 2022-05-05 | End: 2022-09-22 | Stop reason: ALTCHOICE

## 2022-05-05 RX ORDER — UREA 40 %
CREAM (GRAM) TOPICAL 2 TIMES DAILY
Qty: 227 G | Refills: 11 | Status: SHIPPED | OUTPATIENT
Start: 2022-05-05

## 2022-05-05 ASSESSMENT — PAIN SCALES - GENERAL: PAINLEVEL: MODERATE PAIN (5)

## 2022-05-05 NOTE — NURSING NOTE
Chief Complaint   Patient presents with     Derm Problem     Patient is here today for follow up palmar eruptions.      Cynthia JADE CMA

## 2022-05-05 NOTE — LETTER
5/5/2022       RE: Anjali Chery  [address] [place] [address]     Dear Colleague,    Thank you for referring your patient, Anjali Chery, to the [facility] [place] [facility]. Please see a copy of my visit note below.    [facility] Dermatology Note    Encounter Date: May 5, 2022    Dermatology Problem List:  1. Burning pain and redness of feet: scant erythema on dorsal hands, upper arms, thighs  - Unclear etiology; favor erythromelalgia, DDx includes small fiber neuropathy, complex regional pain syndrome  - Initial onset ~2013  - Current tx: gabapentin 300 mg TID, amitriptyline 10 mg nightly, CeraVe with pramoxine, aloe vera; urea 40% for hyperkeratosis  - Past tx: triamcinolone 0.1% oint, mometasone  - Future: repeat biopsy/EM      2. Suspected Sjogren syndrome with history of inflammatory arthritis and chronic pain  - Follows rheum (first seen ~1999)  - Past tx: MTX (~2014), prednisone, Plaquenil (several years, stopped due to lack of efficacy and also nausea)    ______________________________________    Impression/Plan:  1. Pain/erythema/burning of feet: clinically consistent with erythromelalgia, however differential includes small fiber neuropathy and complex regional pain syndrome. Will start with uptitration of gabapentin. If refractory, discussed switching fluoxetine to duloxetine vs carbamazepine, misoprostol; previously trialed pregabalin without improvement. CBC with no abnormalities; autoantibodies largely negative with exception of known +SSA and previously +DANIELA by EIA (negative by IIF). Will also add urea for associated hyperkeratosis on lateral feet.   - uptitrate gabapentin gradually to 900 mg total daily dose (taken as 300 mg TID or 300 mg qpm/600 mg at bedtime)  - start urea 40% cream BID      Follow-up in 4-6 weeks.       Staff Involved:  Staff Only    Over 47 minutes  was spent during this visit on the date of service, including >43 minutes of direct contact time with the patient counseling and coordinating care including the discussion and documentation of diagnosis, natural history, treatment, and prognosis.    Chandan Solis MD   of Dermatology  Department of Dermatology  [facility] [employer] [facility]      CC:   Chief Complaint   Patient presents with     Derm Problem     Patient is here today for follow up palmar eruptions.        History of Present Illness:  Ms. Anjali Chery is a 72 year old female who presents as a return patient for neuropathy    Erythema/purple discoloration in the feet  - Some redness on dorsal hands, upper arms, thighs  - burning in feet  - some flaking/scaling  - no improvement with gabapentin yet - taking 100 mg at bedtime  - had used pregabalin in the past without improvement  - may had used urea cream in past - not sure    Labs:  Care Everywhere 4/2022: normal ESR, CBC unremarkable; reviewed extended autoantibody panel notable only for +SSA    Past Medical History:   Past Medical History:   Diagnosis Date     Back disorder      History of colonic polyps      Hyperlipidemia      Median neuropathy, 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     Peripheral vascular disease (H)      Rectal prolapse      S/P bilateral vein stripping (1975) 9/2/2015     Sjogren's syndrome (H) 6/24/2015     Venous insufficiency 9/2/2015     Past Surgical History:   Procedure Laterality Date     APPENDECTOMY       CHOLECYSTECTOMY       COLECTOMY       COLONOSCOPY      multiple     rectal prolapse repair[       rectocele/enterocele repair[       RECTOSIGMOIDECTOMY PERINEAL  11/13/2013    Procedure: RECTOSIGMOIDECTOMY PERINEAL;  [facility] Procedure, Exam under anesthesia;  Surgeon: Amadou Barragan MD;  Location: [facility] OR     removal of tubes and ovaries[       sling procedure[       tumor removed right ilium[       vaginal prolapse repair[         Social History:    reports that she has never smoked. She has never used smokeless tobacco. She reports current alcohol use. She reports that she does not use drugs.    Family History:  Family History   Problem Relation Age of Onset     Leukemia Mother      Brain Tumor Father      Leukemia Maternal Grandmother        Medications:  Current Outpatient Medications   Medication Sig Dispense Refill     AMITRIPTYLINE HCL PO Take 10 mg by mouth At Bedtime       aspirin 81 MG tablet Take 2 tablets by mouth 2 times daily        baclofen (LIORESAL) 20 MG tablet Take 20 mg by mouth 3 times daily as needed       butalbital-acetaminophen-caffeine (FIORICET, ESGIC) -40 MG per tablet Take 1-2 tablets by mouth as needed       Calcium-Magnesium-Zinc 333-133-5 MG TABS Take by mouth daily       Cholecalciferol (VITAMIN D3 PO) Take 1,000 Units by mouth daily        Coenzyme Q10 (CO Q 10 PO) Take 1 capsule by mouth daily       COMPRESSION STOCKINGS 1 each daily 20-30 mm Hg thigh or panty hose compression hosiery.  Measure and fit.  Style and brand per patient choice. 3 Units 6     Cyanocobalamin (B-12 PO) Take 1,000 mg by mouth daily        cyclobenzaprine (FLEXERIL) 10 MG tablet Take 10 mg by mouth 2 times daily as needed        fish oil-omega-3 fatty acids 1000 MG capsule Take 1,200 mg by mouth 2 times daily        Flaxseed, Linseed, (FLAXSEED OIL PO) Take 1,000 mg by mouth daily        folic acid (FOLVITE) 400 MCG tablet Take 1,200 mcg by mouth daily        gabapentin (NEURONTIN) 100 MG capsule Take 1 capsule (100 mg) by mouth 3 times daily 90 capsule 3     GARLIC PO Take 1,000 mg by mouth daily        Lutein 20 MG CAPS Take 1 capsule by mouth daily       senna-docusate (SENOKOT-S;PERICOLACE) 8.6-50 MG per tablet Take 1 tablet by mouth 2 times daily as needed for constipation (Patient taking differently: Take 1 tablet by mouth 2 times daily) 60 tablet 1     UNABLE TO FIND daily MEDICATION NAME: Glucosamine Sulf/Tonio/MSM 1.5/1.2/.5       zolpidem  (AMBIEN) 10 MG tablet Take 5 mg by mouth nightly as needed        Allergies   Allergen Reactions     Plaquenil [Hydroxychloroquine] Nausea       Physical exam:  Vitals: There were no vitals taken for this visit.  GEN: This is a well developed, well-nourished female in no acute distress, in a pleasant mood.    SKIN: Stein phototype II  - Total skin excluding the undergarment areas was performed. The exam included the head/face, neck, both arms, chest, back, abdomen, both legs, digits and/or nails.   - bright red/purple, blanching, warm feet with lateral hyperkeratosis  - faint erythema on the dorsal hands, distal thighs, distal upper arms  - few scattered purpuric macules  - No other lesions of concern on areas examined.

## 2022-05-05 NOTE — PROGRESS NOTES
[facility] Dermatology Note    Encounter Date: May 5, 2022    Dermatology Problem List:  1. Burning pain and redness of feet: scant erythema on dorsal hands, upper arms, thighs  - Unclear etiology; favor erythromelalgia, DDx includes small fiber neuropathy, complex regional pain syndrome  - Initial onset ~2013  - Current tx: gabapentin 300 mg TID, amitriptyline 10 mg nightly, CeraVe with pramoxine, aloe vera; urea 40% for hyperkeratosis  - Past tx: triamcinolone 0.1% oint, mometasone  - Future: repeat biopsy/EM      2. Suspected Sjogren syndrome with history of inflammatory arthritis and chronic pain  - Follows rheum (first seen ~1999)  - Past tx: MTX (~2014), prednisone, Plaquenil (several years, stopped due to lack of efficacy and also nausea)    ______________________________________    Impression/Plan:  1. Pain/erythema/burning of feet: clinically consistent with erythromelalgia, however differential includes small fiber neuropathy and complex regional pain syndrome. Will start with uptitration of gabapentin. If refractory, discussed switching fluoxetine to duloxetine vs carbamazepine, misoprostol; previously trialed pregabalin without improvement. CBC with no abnormalities; autoantibodies largely negative with exception of known +SSA and previously +DANIELA by EIA (negative by IIF). Will also add urea for associated hyperkeratosis on lateral feet.   - uptitrate gabapentin gradually to 900 mg total daily dose (taken as 300 mg TID or 300 mg qpm/600 mg at bedtime)  - start urea 40% cream BID      Follow-up in 4-6 weeks.       Staff Involved:  Staff Only    Over 47 minutes was spent during this visit on the date of service, including >43 minutes of direct contact time with the patient counseling and coordinating care including the discussion and documentation of diagnosis, natural history, treatment, and prognosis.    Chandan Solis MD   of Dermatology  Department of  Dermatology  [facility]      CC:   Chief Complaint   Patient presents with     Derm Problem     Patient is here today for follow up palmar eruptions.        History of Present Illness:  Ms. Anjali Chery is a 72 year old female who presents as a return patient for neuropathy    Erythema/purple discoloration in the feet  - Some redness on dorsal hands, upper arms, thighs  - burning in feet  - some flaking/scaling  - no improvement with gabapentin yet - taking 100 mg at bedtime  - had used pregabalin in the past without improvement  - may had used urea cream in past - not sure    Labs:  Care Everywhere 4/2022: normal ESR, CBC unremarkable; reviewed extended autoantibody panel notable only for +SSA    Past Medical History:   Past Medical History:   Diagnosis Date     Back disorder      History of colonic polyps      Hyperlipidemia      Median neuropathy, 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     Peripheral vascular disease (H)      Rectal prolapse      S/P bilateral vein stripping (1975) 9/2/2015     Sjogren's syndrome (H) 6/24/2015     Venous insufficiency 9/2/2015     Past Surgical History:   Procedure Laterality Date     APPENDECTOMY       CHOLECYSTECTOMY       COLECTOMY       COLONOSCOPY      multiple     rectal prolapse repair[       rectocele/enterocele repair[       RECTOSIGMOIDECTOMY PERINEAL  11/13/2013    Procedure: RECTOSIGMOIDECTOMY PERINEAL;  [facility] Procedure, Exam under anesthesia;  Surgeon: Amadou Barragan MD;  Location: [facility] OR     removal of tubes and ovaries[       sling procedure[       tumor removed right ilium[       vaginal prolapse repair[         Social History:   reports that she has never smoked. She has never used smokeless tobacco. She reports current alcohol use. She reports that she does not use drugs.    Family History:  Family History   Problem Relation Age of Onset     Leukemia Mother      Brain Tumor Father      Leukemia Maternal Grandmother        Medications:  Current  Outpatient Medications   Medication Sig Dispense Refill     AMITRIPTYLINE HCL PO Take 10 mg by mouth At Bedtime       aspirin 81 MG tablet Take 2 tablets by mouth 2 times daily        baclofen (LIORESAL) 20 MG tablet Take 20 mg by mouth 3 times daily as needed       butalbital-acetaminophen-caffeine (FIORICET, ESGIC) -40 MG per tablet Take 1-2 tablets by mouth as needed       Calcium-Magnesium-Zinc 333-133-5 MG TABS Take by mouth daily       Cholecalciferol (VITAMIN D3 PO) Take 1,000 Units by mouth daily        Coenzyme Q10 (CO Q 10 PO) Take 1 capsule by mouth daily       COMPRESSION STOCKINGS 1 each daily 20-30 mm Hg thigh or panty hose compression hosiery.  Measure and fit.  Style and brand per patient choice. 3 Units 6     Cyanocobalamin (B-12 PO) Take 1,000 mg by mouth daily        cyclobenzaprine (FLEXERIL) 10 MG tablet Take 10 mg by mouth 2 times daily as needed        fish oil-omega-3 fatty acids 1000 MG capsule Take 1,200 mg by mouth 2 times daily        Flaxseed, Linseed, (FLAXSEED OIL PO) Take 1,000 mg by mouth daily        folic acid (FOLVITE) 400 MCG tablet Take 1,200 mcg by mouth daily        gabapentin (NEURONTIN) 100 MG capsule Take 1 capsule (100 mg) by mouth 3 times daily 90 capsule 3     GARLIC PO Take 1,000 mg by mouth daily        Lutein 20 MG CAPS Take 1 capsule by mouth daily       senna-docusate (SENOKOT-S;PERICOLACE) 8.6-50 MG per tablet Take 1 tablet by mouth 2 times daily as needed for constipation (Patient taking differently: Take 1 tablet by mouth 2 times daily) 60 tablet 1     UNABLE TO FIND daily MEDICATION NAME: Glucosamine Sulf/Tonio/MSM 1.5/1.2/.5       zolpidem (AMBIEN) 10 MG tablet Take 5 mg by mouth nightly as needed        Allergies   Allergen Reactions     Plaquenil [Hydroxychloroquine] Nausea       Physical exam:  Vitals: There were no vitals taken for this visit.  GEN: This is a well developed, well-nourished female in no acute distress, in a pleasant mood.    SKIN:  Stein phototype II  - Total skin excluding the undergarment areas was performed. The exam included the head/face, neck, both arms, chest, back, abdomen, both legs, digits and/or nails.   - bright red/purple, blanching, warm feet with lateral hyperkeratosis  - faint erythema on the dorsal hands, distal thighs, distal upper arms  - few scattered purpuric macules  - No other lesions of concern on areas examined.

## 2022-05-06 ENCOUNTER — TELEPHONE (OUTPATIENT)
Dept: DERMATOLOGY | Facility: CLINIC | Age: 72
End: 2022-05-06
Payer: MEDICARE

## 2022-05-06 NOTE — TELEPHONE ENCOUNTER
Patient brought in an authorization to share protected health info form to her visit yesterday. She filled out and I faxed it to be scanned into her chart. Will keep in my box.

## 2022-06-06 ENCOUNTER — TELEPHONE (OUTPATIENT)
Dept: DERMATOLOGY | Facility: CLINIC | Age: 72
End: 2022-06-06
Payer: MEDICARE

## 2022-06-06 NOTE — TELEPHONE ENCOUNTER
Per visit note, Dr. Solis wanted to follow up 4-6 weeks after last visit. Scheduled for telephone visit this week. Patient  will email photos.    Rissa Patel, EMT

## 2022-06-06 NOTE — TELEPHONE ENCOUNTER
[facility] Call Center    Phone Message    May a detailed message be left on voicemail: yes     Reason for Call: Medication Question or concern regarding medication   Prescription Clarification  Name of Medication: gabapentin (NEURONTIN) 300 MG capsule  Prescribing Provider: Dr. Solis   Pharmacy:   [facility] [address]     What on the order needs clarification? Pt stopped taking the Rx . It is not working and it was giving Pt side affects. Pt's  would like to discuss with one of the nurses. Pt is in a lot of pain. Thanks           Action Taken: Message routed to:  [facility] Derm    Travel Screening: Not Applicable

## 2022-06-08 ENCOUNTER — TELEPHONE (OUTPATIENT)
Dept: DERMATOLOGY | Facility: CLINIC | Age: 72
End: 2022-06-08
Payer: MEDICARE

## 2022-06-08 NOTE — TELEPHONE ENCOUNTER
[facility] Call Center    Phone Message    May a detailed message be left on voicemail: yes     Reason for Call: Other: Per  Moshe he is sending the pictures now for tomorrow's appointment. Please call him if you do not receive it. [phone]     Action Taken: Message routed to:  [facility] Derm    Travel Screening: Not Applicable

## 2022-06-09 ENCOUNTER — VIRTUAL VISIT (OUTPATIENT)
Dept: DERMATOLOGY | Facility: CLINIC | Age: 72
End: 2022-06-09
Payer: MEDICARE

## 2022-06-09 DIAGNOSIS — I73.81 ERYTHROMELALGIA (H): ICD-10-CM

## 2022-06-09 DIAGNOSIS — G62.9 NEUROPATHY: Primary | ICD-10-CM

## 2022-06-09 PROCEDURE — 99214 OFFICE O/P EST MOD 30 MIN: CPT | Mod: 95 | Performed by: DERMATOLOGY

## 2022-06-09 RX ORDER — FLUOXETINE 10 MG/1
10 CAPSULE ORAL DAILY
Qty: 15 CAPSULE | Refills: 0 | Status: SHIPPED | OUTPATIENT
Start: 2022-06-09 | End: 2022-09-22 | Stop reason: ALTCHOICE

## 2022-06-09 RX ORDER — DULOXETIN HYDROCHLORIDE 30 MG/1
30 CAPSULE, DELAYED RELEASE ORAL 2 TIMES DAILY
Qty: 60 CAPSULE | Refills: 3 | Status: SHIPPED | OUTPATIENT
Start: 2022-06-09 | End: 2022-09-22 | Stop reason: ALTCHOICE

## 2022-06-09 ASSESSMENT — PAIN SCALES - GENERAL: PAINLEVEL: MODERATE PAIN (4)

## 2022-06-09 NOTE — NURSING NOTE
Dermatology Rooming Note    Anjali Chery's goals for this visit include:   Chief Complaint   Patient presents with     Derm Problem     Pain/erythema/burning of feet - Anjali she is having a hard time with the swelling and pain. It is affecting her sleep. She states the gabapentin did not work with her body and is no longer taking it.     Erendira Villalba, [facility]

## 2022-06-09 NOTE — PATIENT INSTRUCTIONS
[facility] Dermatology Visit    Thank you for allowing us to participate in your care. Your findings, instructions and follow-up plan are as follows:         When should I call my doctor?  If you are worsening or not improving, please, contact us or seek urgent care as noted below.     Who should I call with questions (adults)?  [facility] (adult and pediatric): [phone]  [facility] (adult): [phone]  For urgent needs outside of business hours call the [facility] at [phone] and ask for the dermatology resident on call  If this is a medical emergency and you are unable to reach an ER, Call 911    Who should I call with questions (pediatric)?  [facility]- Pediatric Dermatology  Dr. Lashay Varner, Dr. Adrián Osorio, Dr. Dora Ann, Lydia Serrano, PA  Dr. Dionne Vazquez, Dr. Radha Graff & Dr. Chandan Leiva  Non Urgent  Nurse Triage Line; [phone]- Angelique and Fara RN Care Coordinators   [place] (/Complex ) [phone]    If you need a prescription refill, please contact your pharmacy. Refills are approved or denied by our physicians during normal business hours, Monday through Fridays  Per office policy, refills will not be granted if you have not been seen within the past year (or sooner depending on your child's condition).    Scheduling Information:  Pediatric Appointment Scheduling and Call Center [phone]  Radiology Scheduling- [phone]  Sedation Unit Scheduling- [phone]  [place] Scheduling- General [phone]; Pediatric Dermatology [phone]  Main  Services: [phone]  Cayman Islander: [phone]  Fijian: [phone]  Hmong/Drew/Nicaraguan: [phone]  Preadmission Nursing Department Fax Number: [phone] (fax all pre-operative paperwork to this number)    For urgent matters arising during evenings, weekends, or  holidays that cannot wait for normal business hours please call [phone] and ask for the dermatology resident on call to be paged.

## 2022-06-09 NOTE — LETTER
6/9/2022       RE: Anjali Chery  [address] [place] [address]     Dear Colleague,    Thank you for referring your patient, Anjali Chery, to the [facility] [place] at [facility]. Please see a copy of my visit note below.    [facility] Dermatology Note  Encounter Date: Jun 9, 2022  [employer] and Telephone ([phone]). Location of teledermatologist: [facility] [place].  Start time: 2:01. End time: 2:30.    Dermatology Problem List:  1. Pain and redness of feet and dorsal hands:  - Unclear etiology; ddx includes small fiber neuropathy, complex regional pain syndrome, erythromelalgia  - Initial onset ~2013  - Current tx: duloxetine 30 mg (plan to increase to 60 mg), amitriptyline 10 mg nightly, CeraVe with pramoxine, aloe vera  - Past tx: triamcinolone 0.1% ointment, mometasone, pregabalin, gabapentin  - Future: repeat biopsy/EM      2. Suspected Sjogren syndrome with history of inflammatory arthritis and chronic pain  - Follows rheum (first seen ~1999)  - Past tx: MTX (~2014), prednisone, Plaquenil (several years, stopped due to lack of efficacy and also nausea)       ____________________________________________    Assessment & Plan:     1. Pain and redness of feet and dorsal hands, chronic, worsening. Not responding to gabapentin, which was associated with decreased UOP. We discussed risks/benefits of next steps in treatment including duloxetine, carbamazepine, IVIg, repeat biopsy/EMG. Patient would prefer to avoid repeat biopsy given difficulties with wound healing on the lower extremity (currently caring for chronic ulcer which is gradually healing). Will plan to start duloxetine after tapering fluoxetine (given for perimenopausal flushing).  - reduce fluoxetine to 10 mg daily x2 weeks, then stop  - then start duloxetine 30 mg daily x2 weeks; if  tolerating, increase to 60 mg daily  - low threshold for repeat biopsy for peripheral nerve assessment; EMG referral    Procedures Performed:    None    Follow-up: 6 weeks    Over 33 minutes was spent during this visit on the date of service, including >29 minutes of direct contact time with the patient counseling and coordinating care including the discussion and documentation of diagnosis, natural history, treatment, and prognosis.    Staff:     Chandan Solis MD, FAAD   of Dermatology  Department of Dermatology  [facility]    ____________________________________________    CC: Derm Problem (Pain/erythema/burning of feet - Anjali she is having a hard time with the swelling and pain. It is affecting her sleep. She states the gabapentin did not work with her body and is no longer taking it.)    HPI:  Ms. Anjali Chery is a(n) 72 year old female who presents today as a return patient for burning pain in feet > hands    Burning and swelling - very painful  - wake up frequently  - sometimes can't sleep due to symptoms  - feels and looks like severe sunburn  - legs, feet, knees  - exacerbated by sheets touching in bed  - sometimes arms, face  - had bad side effects to gabapentin - color of urine, urinating much less   - worsening edema in legs   - veins in legs became enlarged   - not thinking well   - stopped last weekend   - was slightly helpful at the beginning, but no significant improvement later  - currently on fluoxetine 20 mg capsule    Patient is otherwise feeling well, without additional skin concerns.    Labs Reviewed:  N/A    Physical Exam:  Vitals: There were no vitals taken for this visit.  SKIN: Teledermatology photos were reviewed; image quality and interpretability: acceptable. Image date: 6/8/22.  - bright erythema on the feet > hands with hyperkeratosis on the plantar surfaces  - onychodystrophy of toenails  - No other lesions of concern on  areas examined.     Medications:  Current Outpatient Medications   Medication     AMITRIPTYLINE HCL PO     aspirin 81 MG tablet     baclofen (LIORESAL) 20 MG tablet     butalbital-acetaminophen-caffeine (FIORICET, ESGIC) -40 MG per tablet     Calcium-Magnesium-Zinc 333-133-5 MG TABS     Cholecalciferol (VITAMIN D3 PO)     Coenzyme Q10 (CO Q 10 PO)     COMPRESSION STOCKINGS     Cyanocobalamin (B-12 PO)     cyclobenzaprine (FLEXERIL) 10 MG tablet     fish oil-omega-3 fatty acids 1000 MG capsule     Flaxseed, Linseed, (FLAXSEED OIL PO)     folic acid (FOLVITE) 400 MCG tablet     gabapentin (NEURONTIN) 100 MG capsule     GARLIC PO     Lutein 20 MG CAPS     senna-docusate (SENOKOT-S;PERICOLACE) 8.6-50 MG per tablet     UNABLE TO FIND     Urea 40 % CREA     zolpidem (AMBIEN) 10 MG tablet     gabapentin (NEURONTIN) 300 MG capsule     No current facility-administered medications for this visit.      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 on close of this encounter.

## 2022-06-09 NOTE — PROGRESS NOTES
[facility] Dermatology Note  Encounter Date: Jun 9, 2022  Store-and-Forward and Telephone ([phone]). Location of teledermatologist: [facility] [place].  Start time: 2:01. End time: 2:30.    Dermatology Problem List:  1. Pain and redness of feet and dorsal hands:  - Unclear etiology; ddx includes small fiber neuropathy, complex regional pain syndrome, erythromelalgia  - Initial onset ~2013  - Current tx: duloxetine 30 mg (plan to increase to 60 mg), amitriptyline 10 mg nightly, CeraVe with pramoxine, aloe vera  - Past tx: triamcinolone 0.1% ointment, mometasone, pregabalin, gabapentin  - Future: repeat biopsy/EM      2. Suspected Sjogren syndrome with history of inflammatory arthritis and chronic pain  - Follows rheum (first seen ~1999)  - Past tx: MTX (~2014), prednisone, Plaquenil (several years, stopped due to lack of efficacy and also nausea)       ____________________________________________    Assessment & Plan:     1. Pain and redness of feet and dorsal hands, chronic, worsening. Not responding to gabapentin, which was associated with decreased UOP. We discussed risks/benefits of next steps in treatment including duloxetine, carbamazepine, IVIg, repeat biopsy/EMG. Patient would prefer to avoid repeat biopsy given difficulties with wound healing on the lower extremity (currently caring for chronic ulcer which is gradually healing). Will plan to start duloxetine after tapering fluoxetine (given for perimenopausal flushing).  - reduce fluoxetine to 10 mg daily x2 weeks, then stop  - then start duloxetine 30 mg daily x2 weeks; if tolerating, increase to 60 mg daily  - low threshold for repeat biopsy for peripheral nerve assessment; EMG referral    Procedures Performed:    None    Follow-up: 6 weeks    Over 33 minutes was spent during this visit on the date of service, including >29 minutes of direct contact time with the patient counseling and coordinating  care including the discussion and documentation of diagnosis, natural history, treatment, and prognosis.    Staff:     Chandan Solis MD, FAAD   of Dermatology  Department of Dermatology  [facility] [employer]    ____________________________________________    CC: Derm Problem (Pain/erythema/burning of feet - Anjali she is having a hard time with the swelling and pain. It is affecting her sleep. She states the gabapentin did not work with her body and is no longer taking it.)    HPI:  Ms. Anjali Chery is a(n) 72 year old female who presents today as a return patient for burning pain in feet > hands    Burning and swelling - very painful  - wake up frequently  - sometimes can't sleep due to symptoms  - feels and looks like severe sunburn  - legs, feet, knees  - exacerbated by sheets touching in bed  - sometimes arms, face  - had bad side effects to gabapentin - color of urine, urinating much less   - worsening edema in legs   - veins in legs became enlarged   - not thinking well   - stopped last weekend   - was slightly helpful at the beginning, but no significant improvement later  - currently on fluoxetine 20 mg capsule    Patient is otherwise feeling well, without additional skin concerns.    Labs Reviewed:  N/A    Physical Exam:  Vitals: There were no vitals taken for this visit.  SKIN: Teledermatology photos were reviewed; image quality and interpretability: acceptable. Image date: 6/8/22.  - bright erythema on the feet > hands with hyperkeratosis on the plantar surfaces  - onychodystrophy of toenails  - No other lesions of concern on areas examined.     Medications:  Current Outpatient Medications   Medication     AMITRIPTYLINE HCL PO     aspirin 81 MG tablet     baclofen (LIORESAL) 20 MG tablet     butalbital-acetaminophen-caffeine (FIORICET, ESGIC) -40 MG per tablet     Calcium-Magnesium-Zinc 333-133-5 MG TABS     Cholecalciferol (VITAMIN D3 PO)      Coenzyme Q10 (CO Q 10 PO)     COMPRESSION STOCKINGS     Cyanocobalamin (B-12 PO)     cyclobenzaprine (FLEXERIL) 10 MG tablet     fish oil-omega-3 fatty acids 1000 MG capsule     Flaxseed, Linseed, (FLAXSEED OIL PO)     folic acid (FOLVITE) 400 MCG tablet     gabapentin (NEURONTIN) 100 MG capsule     GARLIC PO     Lutein 20 MG CAPS     senna-docusate (SENOKOT-S;PERICOLACE) 8.6-50 MG per tablet     UNABLE TO FIND     Urea 40 % CREA     zolpidem (AMBIEN) 10 MG tablet     gabapentin (NEURONTIN) 300 MG capsule     No current facility-administered medications for this visit.      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 on close of this encounter.

## 2022-06-30 ENCOUNTER — VIRTUAL VISIT (OUTPATIENT)
Dept: DERMATOLOGY | Facility: CLINIC | Age: 72
End: 2022-06-30
Payer: MEDICARE

## 2022-06-30 DIAGNOSIS — I73.81 ERYTHROMELALGIA (H): Primary | ICD-10-CM

## 2022-06-30 DIAGNOSIS — T56.1X4A TOXIC EFFECT OF MERCURY AND ITS COMPOUNDS, UNDETERMINED, INITIAL ENCOUNTER: ICD-10-CM

## 2022-06-30 DIAGNOSIS — T56.0X4A TOXIC EFFECT OF LEAD AND ITS COMPOUNDS, UNDETERMINED, INITIAL ENCOUNTER: ICD-10-CM

## 2022-06-30 DIAGNOSIS — D46.9 MYELODYSPLASIA (MYELODYSPLASTIC SYNDROME) (H): ICD-10-CM

## 2022-06-30 PROCEDURE — 99215 OFFICE O/P EST HI 40 MIN: CPT | Mod: 95 | Performed by: DERMATOLOGY

## 2022-06-30 ASSESSMENT — PAIN SCALES - GENERAL: PAINLEVEL: SEVERE PAIN (6)

## 2022-06-30 NOTE — NURSING NOTE
Dermatology Rooming Note    Anjali Chery's goals for this visit include:   Chief Complaint   Patient presents with     Derm Problem     Follow up on neuropathy, states that it is getting worse     Pippa Macias CMA on 6/30/2022 at 2:56 PM

## 2022-06-30 NOTE — PROGRESS NOTES
[facility] Dermatology Note  Encounter Date: Jun 30, 2022  Store-and-Forward and Telephone ([phone]). Location of teledermatologist: [facility]ERMAT[facility] [place].  Start time: 2:59. End time: 3:30.    Dermatology Problem List:  1. Pain and redness of feet and dorsal hands:  - Unclear etiology; ddx includes small fiber neuropathy, complex regional pain syndrome, erythromelalgia  - Initial onset ~2013  - Current tx: duloxetine 30 mg (plan to increase to 60 mg), amitriptyline 12.5 mg nightly, CeraVe with pramoxine, aloe vera  - Past tx: triamcinolone 0.1% ointment, mometasone, pregabalin, gabapentin  - Future: repeat biopsy/EM      2. Suspected Sjogren syndrome with history of inflammatory arthritis and chronic pain  - Follows rheum (first seen ~1999)  - Past tx: MTX (~2014), prednisone, Plaquenil (several years, stopped due to lack of efficacy and also nausea)       ____________________________________________    Assessment & Plan:     1. 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 We discussed addition of misoprostol and repeat biopsy; patient defers for now until in-person visit in 3 weeks. Also on low-dose amitriptyline. Will check additional labs given number of OTC supplements and past elevated B12 levels, as well as potential for heavy metal poisoning with lead and mercury.  - flow cytometry, peripheral smear, CBC, B12, B6, lead, mercury  - continue duloxetine uptitration schedule  - plan for misoprostol 200 mcg BID at next visit      Procedures Performed:    None    Follow-up: 3 weeks as scheduled    Staff:     Over 44 minutes was spent during this visit on the date of service, including direct contact time with the patient counseling and coordinating care including the discussion and documentation of diagnosis, natural history, treatment, and prognosis.      Chandan Solis MD, FAAD    of Dermatology  Department of Dermatology  [facility] [employer]    ____________________________________________    CC: Derm Problem (Follow up on neuropathy, states that it is getting worse)    HPI:  Ms. Anjali Chery is a(n) 72 year old female who presents today as a return patient for neuropathy    Neuropathy - pain seems to be progressing - more pain in hands than prior  - feet and legs also hurt - still getting flushing  - pain - burns  - dry cloth touching the areas tends to exacerbate symptoms  - if shiny/smooth, not as painful  - at last visit - stopped to fluoxetine and switched to duloxetine - started this 4 days ago   - tolerating okay at this time   - now having pasty bowel movements - change in texture but not color  - also on amitriptyline 1/2 tablet  - feet and legs also swell independent of redness  - getting swelling in hands  - flushing of face & ears  - takes aspirin 81 mg BID, and number      Patient is otherwise feeling well, without additional skin concerns.    Labs Reviewed:  N/A    Physical Exam:  Vitals: There were no vitals taken for this visit.  SKIN: no photos    Medications:  Current Outpatient Medications   Medication     AMITRIPTYLINE HCL PO     aspirin 81 MG tablet     baclofen (LIORESAL) 20 MG tablet     butalbital-acetaminophen-caffeine (FIORICET, ESGIC) -40 MG per tablet     Calcium-Magnesium-Zinc 333-133-5 MG TABS     Cholecalciferol (VITAMIN D3 PO)     Coenzyme Q10 (CO Q 10 PO)     COMPRESSION STOCKINGS     Cyanocobalamin (B-12 PO)     cyclobenzaprine (FLEXERIL) 10 MG tablet     DULoxetine (CYMBALTA) 30 MG capsule     fish oil-omega-3 fatty acids 1000 MG capsule     Flaxseed, Linseed, (FLAXSEED OIL PO)     FLUoxetine (PROZAC) 10 MG capsule     folic acid (FOLVITE) 400 MCG tablet     gabapentin (NEURONTIN) 100 MG capsule     gabapentin (NEURONTIN) 300 MG capsule     GARLIC PO     Lutein 20 MG CAPS     senna-docusate (SENOKOT-S;PERICOLACE) 8.6-50 MG  per tablet     UNABLE TO FIND     Urea 40 % CREA     zolpidem (AMBIEN) 10 MG tablet     No current facility-administered medications for this visit.      Past Medical/Surgical History:   Patient Active Problem List   Diagnosis     Rectal prolapse     Median neuropathy, left     Median neuropathy, right     Sjogren's syndrome (H)     Hx of bilateral carpal tunnel repair     S/P bilateral vein stripping (1975)     Venous insufficiency     Past Medical History:   Diagnosis Date     Back disorder      History of colonic polyps      Hyperlipidemia      Median neuropathy, 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     Peripheral vascular disease (H)      Rectal prolapse      S/P bilateral vein stripping (1975) 9/2/2015     Sjogren's syndrome (H) 6/24/2015     Venous insufficiency 9/2/2015       CC Manuelito Simpson  [address]             [place] [phone] on close of this encounter.

## 2022-07-21 ENCOUNTER — LAB (OUTPATIENT)
Dept: LAB | Facility: CLINIC | Age: 72
End: 2022-07-21
Payer: MEDICARE

## 2022-07-21 ENCOUNTER — OFFICE VISIT (OUTPATIENT)
Dept: DERMATOLOGY | Facility: CLINIC | Age: 72
End: 2022-07-21
Payer: MEDICARE

## 2022-07-21 ENCOUNTER — DOCUMENTATION ONLY (OUTPATIENT)
Dept: LAB | Facility: CLINIC | Age: 72
End: 2022-07-21

## 2022-07-21 DIAGNOSIS — I73.81 ERYTHROMELALGIA (H): Primary | ICD-10-CM

## 2022-07-21 DIAGNOSIS — G62.9 NEUROPATHY: Primary | ICD-10-CM

## 2022-07-21 DIAGNOSIS — I73.81 ERYTHROMELALGIA (H): ICD-10-CM

## 2022-07-21 DIAGNOSIS — T56.1X4A TOXIC EFFECT OF MERCURY AND ITS COMPOUNDS, UNDETERMINED, INITIAL ENCOUNTER: ICD-10-CM

## 2022-07-21 DIAGNOSIS — T56.0X4A TOXIC EFFECT OF LEAD AND ITS COMPOUNDS, UNDETERMINED, INITIAL ENCOUNTER: ICD-10-CM

## 2022-07-21 DIAGNOSIS — D46.9 MYELODYSPLASIA (MYELODYSPLASTIC SYNDROME) (H): ICD-10-CM

## 2022-07-21 DIAGNOSIS — G25.89 OTHER SPECIFIED EXTRAPYRAMIDAL AND MOVEMENT DISORDERS: ICD-10-CM

## 2022-07-21 LAB
ALBUMIN SERPL-MCNC: 3.7 G/DL (ref 3.4–5)
ALP SERPL-CCNC: 72 U/L (ref 40–150)
ALT SERPL W P-5'-P-CCNC: 35 U/L (ref 0–50)
ANION GAP SERPL CALCULATED.3IONS-SCNC: 8 MMOL/L (ref 3–14)
AST SERPL W P-5'-P-CCNC: 36 U/L (ref 0–45)
BASOPHILS # BLD AUTO: 0 10E3/UL (ref 0–0.2)
BASOPHILS NFR BLD AUTO: 1 %
BILIRUB SERPL-MCNC: 0.2 MG/DL (ref 0.2–1.3)
BUN SERPL-MCNC: 12 MG/DL (ref 7–30)
CALCIUM SERPL-MCNC: 8.8 MG/DL (ref 8.5–10.1)
CHLORIDE BLD-SCNC: 94 MMOL/L (ref 94–109)
CO2 SERPL-SCNC: 29 MMOL/L (ref 20–32)
CREAT SERPL-MCNC: 0.8 MG/DL (ref 0.52–1.04)
EOSINOPHIL # BLD AUTO: 0.2 10E3/UL (ref 0–0.7)
EOSINOPHIL NFR BLD AUTO: 4 %
ERYTHROCYTE [DISTWIDTH] IN BLOOD BY AUTOMATED COUNT: 11.9 % (ref 10–15)
GFR SERPL CREATININE-BSD FRML MDRD: 77 ML/MIN/1.73M2
GLUCOSE BLD-MCNC: 96 MG/DL (ref 70–99)
HCT VFR BLD AUTO: 33.8 % (ref 35–47)
HGB BLD-MCNC: 11.7 G/DL (ref 11.7–15.7)
IMM GRANULOCYTES # BLD: 0 10E3/UL
IMM GRANULOCYTES NFR BLD: 0 %
LYMPHOCYTES # BLD AUTO: 2 10E3/UL (ref 0.8–5.3)
LYMPHOCYTES NFR BLD AUTO: 35 %
MCH RBC QN AUTO: 33.7 PG (ref 26.5–33)
MCHC RBC AUTO-ENTMCNC: 34.6 G/DL (ref 31.5–36.5)
MCV RBC AUTO: 97 FL (ref 78–100)
MONOCYTES # BLD AUTO: 0.5 10E3/UL (ref 0–1.3)
MONOCYTES NFR BLD AUTO: 9 %
NEUTROPHILS # BLD AUTO: 2.9 10E3/UL (ref 1.6–8.3)
NEUTROPHILS NFR BLD AUTO: 51 %
NRBC # BLD AUTO: 0 10E3/UL
NRBC BLD AUTO-RTO: 0 /100
PLATELET # BLD AUTO: 197 10E3/UL (ref 150–450)
POTASSIUM BLD-SCNC: 4.2 MMOL/L (ref 3.4–5.3)
PROT SERPL-MCNC: 6.9 G/DL (ref 6.8–8.8)
RBC # BLD AUTO: 3.47 10E6/UL (ref 3.8–5.2)
RETICS # AUTO: 0.04 10E6/UL (ref 0.03–0.1)
RETICS/RBC NFR AUTO: 1.1 % (ref 0.5–2)
SODIUM SERPL-SCNC: 131 MMOL/L (ref 133–144)
VIT B12 SERPL-MCNC: 1745 PG/ML (ref 193–986)
WBC # BLD AUTO: 5.6 10E3/UL (ref 4–11)

## 2022-07-21 PROCEDURE — 88184 FLOWCYTOMETRY/ TC 1 MARKER: CPT | Performed by: STUDENT IN AN ORGANIZED HEALTH CARE EDUCATION/TRAINING PROGRAM

## 2022-07-21 PROCEDURE — 99000 SPECIMEN HANDLING OFFICE-LAB: CPT | Performed by: PATHOLOGY

## 2022-07-21 PROCEDURE — 88189 FLOWCYTOMETRY/READ 16 & >: CPT | Performed by: STUDENT IN AN ORGANIZED HEALTH CARE EDUCATION/TRAINING PROGRAM

## 2022-07-21 PROCEDURE — 85045 AUTOMATED RETICULOCYTE COUNT: CPT | Performed by: PATHOLOGY

## 2022-07-21 PROCEDURE — 36415 COLL VENOUS BLD VENIPUNCTURE: CPT | Performed by: PATHOLOGY

## 2022-07-21 PROCEDURE — 80053 COMPREHEN METABOLIC PANEL: CPT | Performed by: PATHOLOGY

## 2022-07-21 PROCEDURE — 88185 FLOWCYTOMETRY/TC ADD-ON: CPT | Performed by: DERMATOLOGY

## 2022-07-21 PROCEDURE — 84207 ASSAY OF VITAMIN B-6: CPT | Mod: 90 | Performed by: PATHOLOGY

## 2022-07-21 PROCEDURE — 82607 VITAMIN B-12: CPT | Performed by: PATHOLOGY

## 2022-07-21 PROCEDURE — 83825 ASSAY OF MERCURY: CPT | Mod: 90 | Performed by: PATHOLOGY

## 2022-07-21 PROCEDURE — 85025 COMPLETE CBC W/AUTO DIFF WBC: CPT | Performed by: PATHOLOGY

## 2022-07-21 PROCEDURE — 99215 OFFICE O/P EST HI 40 MIN: CPT | Mod: GC | Performed by: DERMATOLOGY

## 2022-07-21 RX ORDER — MISOPROSTOL 200 UG/1
TABLET ORAL
Qty: 388 TABLET | Refills: 0 | Status: SHIPPED | OUTPATIENT
Start: 2022-07-21 | End: 2022-11-02

## 2022-07-21 ASSESSMENT — PAIN SCALES - GENERAL: PAINLEVEL: SEVERE PAIN (7)

## 2022-07-21 NOTE — NURSING NOTE
"Chief Complaint   Patient presents with     Derm Problem     Erythromelalgia follow up, hoping for guidance on \"what to do\". States that \"the pain has gotten so much work.\"      Sandy WILLS, EMT  Dermatology/Dermatology Surgery  [phone]      "

## 2022-07-21 NOTE — LETTER
7/21/2022       RE: Anjali Chery  [address] [place] [address]     Dear Colleague,    Thank you for referring your patient, Anjali Chery, to the [facility] DERMATOLOG[facility] [place] at [facility]. Please see a copy of my visit note below.    [facility] Dermatology Note  Encounter Date: Jul 21, 2022  Office Visit     Dermatology Problem List:  1. Pain and redness of feet and dorsal hands:  - Unclear etiology; ddx includes small fiber neuropathy, complex regional pain syndrome, erythromelalgia   - Initial onset ~2013  - Current tx: misoprostol, amitriptyline 12.5 mg nightly, CeraVe with pramoxine, aloe vera  - Past tx: triamcinolone 0.1% ointment, mometasone, pregabalin, gabapentin, duloxetine  - Future: repeat biopsy/EM      2. Suspected Sjogren syndrome with history of inflammatory arthritis and chronic pain  - Follows rheum (first seen ~1999)  - Past tx: MTX (~2014), prednisone, Plaquenil (several years, stopped due to lack of efficacy and also nausea)    ____________________________________________    Assessment & Plan:       1. Pain and redness of feet and dorsal hands:  Unclear etiology, ddx includes small fiber neuropathy, erythromelalgia, complex regional pain syndrome, heavy metal poisoning. Her cutaneous findings are not particularly dramatic, and her pain is more diffuse than would be expected with solely erythromelalgia. In addition, she exhibits somewhat slowed speech, tangential responses, and appeared to have difficulty with processing during the interview, which could be independent and related to early dementia or indicative of an underlying unifying cause such as heavy metal poisoning. She has stopped taking duloxetine due to GI intolerance, and numerous other neuropathic pain medications have been attempted prior. Misoprostol has proven effective for erythromelalgia, is quite safe, and  "would not worsen her condition should it prove to not be erythromelalgia. Continue to believe she may eventually need a peripheral nerve biopsy to get to the bottom of her symptoms, though she continues to be resistant to this idea.  - Laboratory: CBC, blood morphology and flow cytometry, vitamin B6 level, vitamin B12 level, lead, mercury, CMP, UA  - Misoprostol 200 mg BID x14 days, 400 mg BID thereafter  - Readdress biopsy at next visit    Procedures Performed:   None    Follow-up: 2 month(s) in-person, or earlier for new or changing lesions    Staff and Resident:     Franco Hallman MD  Medicine/Dermatology PGY-2  *[phone]    Staff Physician Comments:   I saw and evaluated the patient with the resident and I edited the assessment and plan as documented in the note. I was present for the examination.    Over 40 minutes was spent during this visit on the date of service, including direct contact time with the patient counseling and coordinating care, review of the medical records, and documentation of diagnosis, natural history, treatment, and prognosis. This excludes time performing any relevant procedures.      Chandan Solis MD   of Dermatology  Department of Dermatology  [facility] [employer]      ____________________________________________    CC: Derm Problem (Erythromelalgia follow up, hoping for guidance on \"what to do\". States that \"the pain has gotten so much work.\" )    HPI:  Ms. Anjali Chery is a(n) 72 year old female who presents today as a return patient for hand and feet pain and redness.    - Doing much better today than recently. Veins not \"popping out\" as much. However, states that recently she has actually been feeling worse.  - States she had terrible diarrhea with duloxetine. Has now stopped taking, last dose 7/3/22.  - Taking 12.5 amitriptyline every day, 5 mg of cyclobenzaprine every day, 20 mg baclofen TID, bisoprolol 2.5 mg every day. Stopped " "zolpidem due to no perceived effect.  - Still reports swelling of feet, hands, legs R>L   - Still has lots of pain that feel like \"hot bad burning shocks, really bad\" in hands, arms, feet, legs. R knee very bothersome. Also head, neck, and very bothersome on her buttocks. Denies that any part of her body is without pain.  - Using cerave and aloe vera each BID and not together.  - Notes that contact makes symptoms worse  - Always pain when she has redness in her limbs. However, not always red when she has pain.  - Notes that she has had some difficulty with dark urine recently though denies fever, chills, dysuria, any independent flank-specific pain. Has not made any changes to her fluid intake or activity levels.  - Notes that she has significant difficulty touching metal. She reports she has a negative side and a positive side, with one drawing her to things, and one pushing her away. She also notes that she believes her head is changing shape.    Patient is otherwise feeling well, without additional skin concerns.    Labs Reviewed:  N/A    Physical Exam:  Vitals: There were no vitals taken for this visit.  SKIN: the head/face, neck, both arms, digits, and/or nails was examined.   - dull red/purple, blanching, warm feet with lateral hyperkeratosis  - faint erythema on the dorsal hands, distal thighs, distal upper arms  - onychodystrophy of finger and toenails bilaterally  - No other lesions of concern on areas examined.     Medications:  Current Outpatient Medications   Medication     AMITRIPTYLINE HCL PO     aspirin 81 MG tablet     baclofen (LIORESAL) 20 MG tablet     butalbital-acetaminophen-caffeine (FIORICET, ESGIC) -40 MG per tablet     Calcium-Magnesium-Zinc 333-133-5 MG TABS     Cholecalciferol (VITAMIN D3 PO)     Coenzyme Q10 (CO Q 10 PO)     COMPRESSION STOCKINGS     Cyanocobalamin (B-12 PO)     cyclobenzaprine (FLEXERIL) 10 MG tablet     fish oil-omega-3 fatty acids 1000 MG capsule     Flaxseed, " Linseed, (FLAXSEED OIL PO)     folic acid (FOLVITE) 400 MCG tablet     GARLIC PO     Lutein 20 MG CAPS     senna-docusate (SENOKOT-S;PERICOLACE) 8.6-50 MG per tablet     UNABLE TO FIND     Urea 40 % CREA     DULoxetine (CYMBALTA) 30 MG capsule     FLUoxetine (PROZAC) 10 MG capsule     gabapentin (NEURONTIN) 100 MG capsule     gabapentin (NEURONTIN) 300 MG capsule     zolpidem (AMBIEN) 10 MG tablet     No current facility-administered medications for this visit.      Past Medical History:   Patient Active Problem List   Diagnosis     Rectal prolapse     Median neuropathy, left     Median neuropathy, right     Sjogren's syndrome (H)     Hx of bilateral carpal tunnel repair     S/P bilateral vein stripping (1975)     Venous insufficiency     Past Medical History:   Diagnosis Date     Back disorder      History of colonic polyps      Hyperlipidemia      Median neuropathy, 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     Peripheral vascular disease (H)      Rectal prolapse      S/P bilateral vein stripping (1975) 9/2/2015     Sjogren's syndrome (H) 6/24/2015     Venous insufficiency 9/2/2015       CC Referred Self, MD  No address on file on close of this encounter.

## 2022-07-21 NOTE — PROGRESS NOTES
[facility] Dermatology Note  Encounter Date: Jul 21, 2022  Office Visit     Dermatology Problem List:  1. Pain and redness of feet and dorsal hands:  - Unclear etiology; ddx includes small fiber neuropathy, complex regional pain syndrome, erythromelalgia   - Initial onset ~2013  - Current tx: misoprostol, amitriptyline 12.5 mg nightly, CeraVe with pramoxine, aloe vera  - Past tx: triamcinolone 0.1% ointment, mometasone, pregabalin, gabapentin, duloxetine  - Future: repeat biopsy/EM      2. Suspected Sjogren syndrome with history of inflammatory arthritis and chronic pain  - Follows rheum (first seen ~1999)  - Past tx: MTX (~2014), prednisone, Plaquenil (several years, stopped due to lack of efficacy and also nausea)    ____________________________________________    Assessment & Plan:       1. Pain and redness of feet and dorsal hands:  Unclear etiology, ddx includes small fiber neuropathy, erythromelalgia, complex regional pain syndrome, heavy metal poisoning. Her cutaneous findings are not particularly dramatic, and her pain is more diffuse than would be expected with solely erythromelalgia. In addition, she exhibits somewhat slowed speech, tangential responses, and appeared to have difficulty with processing during the interview, which could be independent and related to early dementia or indicative of an underlying unifying cause such as heavy metal poisoning. She has stopped taking duloxetine due to GI intolerance, and numerous other neuropathic pain medications have been attempted prior. Misoprostol has proven effective for erythromelalgia, is quite safe, and would not worsen her condition should it prove to not be erythromelalgia. Continue to believe she may eventually need a peripheral nerve biopsy to get to the bottom of her symptoms, though she continues to be resistant to this idea.  - Laboratory: CBC, blood morphology and flow cytometry, vitamin B6 level, vitamin B12 level, lead,  "mercury, CMP, UA  - Misoprostol 200 mg BID x14 days, 400 mg BID thereafter  - Readdress biopsy at next visit    Procedures Performed:   None    Follow-up: 2 month(s) in-person, or earlier for new or changing lesions    Staff and Resident:     Franco Hallman MD  Medicine/Dermatology PGY-2  *[id number]    Staff Physician Comments:   I saw and evaluated the patient with the resident and I edited the assessment and plan as documented in the note. I was present for the examination.    Over 40 minutes was spent during this visit on the date of service, including direct contact time with the patient counseling and coordinating care, review of the medical records, and documentation of diagnosis, natural history, treatment, and prognosis. This excludes time performing any relevant procedures.      Chandan Solis MD   of Dermatology  Department of Dermatology  [facility] [employer]      ____________________________________________    CC: Derm Problem (Erythromelalgia follow up, hoping for guidance on \"what to do\". States that \"the pain has gotten so much work.\" )    HPI:  Ms. Anjali Chery is a(n) 72 year old female who presents today as a return patient for hand and feet pain and redness.    - Doing much better today than recently. Veins not \"popping out\" as much. However, states that recently she has actually been feeling worse.  - States she had terrible diarrhea with duloxetine. Has now stopped taking, last dose 7/3/22.  - Taking 12.5 amitriptyline every day, 5 mg of cyclobenzaprine every day, 20 mg baclofen TID, bisoprolol 2.5 mg every day. Stopped zolpidem due to no perceived effect.  - Still reports swelling of feet, hands, legs R>L   - Still has lots of pain that feel like \"hot bad burning shocks, really bad\" in hands, arms, feet, legs. R knee very bothersome. Also head, neck, and very bothersome on her buttocks. Denies that any part of her body is without pain.  - Using " cerave and aloe vera each BID and not together.  - Notes that contact makes symptoms worse  - Always pain when she has redness in her limbs. However, not always red when she has pain.  - Notes that she has had some difficulty with dark urine recently though denies fever, chills, dysuria, any independent flank-specific pain. Has not made any changes to her fluid intake or activity levels.  - Notes that she has significant difficulty touching metal. She reports she has a negative side and a positive side, with one drawing her to things, and one pushing her away. She also notes that she believes her head is changing shape.    Patient is otherwise feeling well, without additional skin concerns.    Labs Reviewed:  N/A    Physical Exam:  Vitals: There were no vitals taken for this visit.  SKIN: the head/face, neck, both arms, digits, and/or nails was examined.   - dull red/purple, blanching, warm feet with lateral hyperkeratosis  - faint erythema on the dorsal hands, distal thighs, distal upper arms  - onychodystrophy of finger and toenails bilaterally  - No other lesions of concern on areas examined.     Medications:  Current Outpatient Medications   Medication     AMITRIPTYLINE HCL PO     aspirin 81 MG tablet     baclofen (LIORESAL) 20 MG tablet     butalbital-acetaminophen-caffeine (FIORICET, ESGIC) -40 MG per tablet     Calcium-Magnesium-Zinc 333-133-5 MG TABS     Cholecalciferol (VITAMIN D3 PO)     Coenzyme Q10 (CO Q 10 PO)     COMPRESSION STOCKINGS     Cyanocobalamin (B-12 PO)     cyclobenzaprine (FLEXERIL) 10 MG tablet     fish oil-omega-3 fatty acids 1000 MG capsule     Flaxseed, Linseed, (FLAXSEED OIL PO)     folic acid (FOLVITE) 400 MCG tablet     GARLIC PO     Lutein 20 MG CAPS     senna-docusate (SENOKOT-S;PERICOLACE) 8.6-50 MG per tablet     UNABLE TO FIND     Urea 40 % CREA     DULoxetine (CYMBALTA) 30 MG capsule     FLUoxetine (PROZAC) 10 MG capsule     gabapentin (NEURONTIN) 100 MG capsule      gabapentin (NEURONTIN) 300 MG capsule     zolpidem (AMBIEN) 10 MG tablet     No current facility-administered medications for this visit.      Past Medical History:   Patient Active Problem List   Diagnosis     Rectal prolapse     Median neuropathy, left     Median neuropathy, right     Sjogren's syndrome (H)     Hx of bilateral carpal tunnel repair     S/P bilateral vein stripping (1975)     Venous insufficiency     Past Medical History:   Diagnosis Date     Back disorder      History of colonic polyps      Hyperlipidemia      Median neuropathy, 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     Peripheral vascular disease (H)      Rectal prolapse      S/P bilateral vein stripping (1975) 9/2/2015     Sjogren's syndrome (H) 6/24/2015     Venous insufficiency 9/2/2015       CC Referred Self, MD  No address on file on close of this encounter.

## 2022-07-21 NOTE — PATIENT INSTRUCTIONS
- Start misoprostol at 200 micrograms twice a day for two weeks, then increase to 400 micrograms twice daily thereafter  - Please go get labs drawn today  - You will be called soon to schedule appointment with:   - Pain Management to schedule a visit to discuss your overall pain management and help us get on top of your pain

## 2022-07-22 ENCOUNTER — TELEPHONE (OUTPATIENT)
Dept: DERMATOLOGY | Facility: CLINIC | Age: 72
End: 2022-07-22

## 2022-07-22 LAB
PATH REPORT.COMMENTS IMP SPEC: NORMAL
PATH REPORT.COMMENTS IMP SPEC: NORMAL
PATH REPORT.FINAL DX SPEC: NORMAL
PATH REPORT.MICROSCOPIC SPEC OTHER STN: NORMAL
PATH REPORT.MICROSCOPIC SPEC OTHER STN: NORMAL
PATH REPORT.RELEVANT HX SPEC: NORMAL

## 2022-07-22 NOTE — TELEPHONE ENCOUNTER
[facility] Call Center    Phone Message    May a detailed message be left on voicemail: yes     Reason for Call: Medication Question or concern regarding medication   Prescription Clarification  Name of Medication: misoprostol (CYTOTEC) 200 MCG   Prescribing Provider: Dr. Solis   Pharmacy: [facility] [address]   What on the order needs clarification? Pt's  Moshe states the Rx is too expensive and the pharmacy told him that the Rx is very hard to get right now.     Please call Moshe back to discuss. Thank you.     Action Taken: Message routed to:  [facility] Derm    Travel Screening: Not Applicable

## 2022-07-22 NOTE — TELEPHONE ENCOUNTER
Spoke with patient and  when they called in--helped schedule follow up. Pt and  state this prescription is too expensive and are wondering if there is an alternative. Routing medication questions to Dr. Solis.

## 2022-07-22 NOTE — TELEPHONE ENCOUNTER
Called in to schedule 2 month follow up with Dr. Solis. Also asked about a medication, call center routing TE

## 2022-07-25 LAB
PATH REPORT.COMMENTS IMP SPEC: NORMAL
PATH REPORT.FINAL DX SPEC: NORMAL
PATH REPORT.MICROSCOPIC SPEC OTHER STN: NORMAL
PATH REPORT.RELEVANT HX SPEC: NORMAL

## 2022-07-26 NOTE — TELEPHONE ENCOUNTER
Unfortunately we don't have a substitution for a medication in the same class that has clinical data to support it. It looks like it costs ~$30 for 60 tablets on GoodRx at a number of commercial pharmacies ([facility], [facility], [facility]) if this is a bit more reasonably priced than [facility]. Thanks

## 2022-07-27 ENCOUNTER — TELEPHONE (OUTPATIENT)
Dept: DERMATOLOGY | Facility: CLINIC | Age: 72
End: 2022-07-27

## 2022-07-27 LAB
MERCURY BLD-MCNC: <2.5 UG/L
PYRIDOXAL PHOS SERPL-SCNC: 277.7 NMOL/L

## 2022-07-27 NOTE — TELEPHONE ENCOUNTER
[facility] Call Center    Phone Message    May a detailed message be left on voicemail: yes     Reason for Call: Pt's  is returning call from Lashanda in clinic to discuss the Rx Dr. Solis is prescribing. Pt's  commented that they do not care for this Rx. Please call pt's  back to discuss. Call today 07/27 before 2:30 or tomorrow 07/28 anytime. Thanks     Action Taken: Message routed to:  [facility] Derm    Travel Screening: Not Applicable

## 2022-07-27 NOTE — TELEPHONE ENCOUNTER
"Called pt back to discuss message from Dr. Solis. The patient and  stated that they \"do not support the  pill\" and declined using this medication. Informed pt and  that at this time there are no other clinical-data supported alternatives, as stated by Dr. Solis.     The patient asked \"is there anything I can do for the dryness and the pain?\"    1. The patient stated that she uses CeraVe lotion for rough and bumpy skin, [facility] aloe vera cream, and uses gold bond soft foot cream with shea butter that contains urea.     2. The patient is also wondering \"what are the reasons for me to have such difficulty to be touching things--like the computer, remotes, anything? Handling those really hurts.\"    3. The patient asked \"can electricity on a person cause this pain?\" She asked if touching objects could cause electricity to \"build up\" and cause pain.     Routing to Dr. Solis for advice.       "

## 2022-08-01 NOTE — TELEPHONE ENCOUNTER
For the rough & bumpy skin, I recommend using urea 20% cream or ammonium lactate 12% cream. Both are available over the counter.  This is called hyperalgesia & allodynia, which are an increased sensitivity to pain & feeling pain from responses that don't normally cause it, respectively. Both of these are issues with the nerves misbehaving. The answer to why this is happening is not totally clear, that's why we have discussed potentially repeating a biopsy. Hopefully the pain medicine doctors can offer us some assistance in managing these symptoms.  No, even though the pain may feel electrical, these types of symptoms are not due to built-up static electrical charge on a person. It's due to misbehaving nerves causing neuropathy, which often causes the sensation of electricity.

## 2022-08-01 NOTE — TELEPHONE ENCOUNTER
RN called patient and patient's  to relay information of Dr. Solis's in the note previously, both acknowledged. Patient and her  plan on seeing a pain medicine doctor shortly to address the nerve pain.    Luciana ADAMS RN

## 2022-08-24 ENCOUNTER — TELEPHONE (OUTPATIENT)
Dept: DERMATOLOGY | Facility: CLINIC | Age: 72
End: 2022-08-24

## 2022-08-24 NOTE — TELEPHONE ENCOUNTER
[facility] Call Center    Phone Message    May a detailed message be left on voicemail: yes     Reason for Call: Other: Pt's  would like to talk to Dr. Solis's nurse. Please call in the afternoon [phone]. Thanks!     Action Taken: Message routed to:  [facility] DERM    Travel Screening: Not Applicable

## 2022-08-25 NOTE — TELEPHONE ENCOUNTER
RN called patient's ,  stated Dr. Solis ordered 3 labs for urine that patient was originally going to have done at a place in [place], however patient will be coming to the [facility] for an appointment on Monday, and was wondering if the labs could be done here. RN stated yes, RN saw future urine lab orders, and gave patient phone number to scheduling so that he could set that up.    Luciana ADAMS RN

## 2022-08-26 ENCOUNTER — TELEPHONE (OUTPATIENT)
Dept: ANESTHESIOLOGY | Facility: CLINIC | Age: 72
End: 2022-08-26

## 2022-08-26 NOTE — TELEPHONE ENCOUNTER
I called patient to remind them of there appointment at 3:10 pm to arrive 15-20 minutes prior allow time for parking    Also to complete there questionnaire prior to there appointment

## 2022-08-26 NOTE — TELEPHONE ENCOUNTER
[facility] Call Center    Phone Message    May a detailed message be left on voicemail: yes     Reason for Call: Other: Pt  Rosalio called and stated that he recieved a message that his wife needs to complete a questionnaire  on [employer] but they are not signed up in [employer] so he would like the form emailed to [email or web address]     Action Taken: Message routed to:  [facility]    Travel Screening: Not Applicable

## 2022-09-19 ENCOUNTER — TELEPHONE (OUTPATIENT)
Dept: ANESTHESIOLOGY | Facility: CLINIC | Age: 72
End: 2022-09-19

## 2022-09-19 NOTE — TELEPHONE ENCOUNTER
I called patient to remind them of there appointment at 12:50 pm to arrive 15-20 minutes prior allow time for parking    Also to complete there questionnaire prior to there appointment

## 2022-09-20 ENCOUNTER — OFFICE VISIT (OUTPATIENT)
Dept: ANESTHESIOLOGY | Facility: CLINIC | Age: 72
End: 2022-09-20
Attending: DERMATOLOGY
Payer: MEDICARE

## 2022-09-20 ENCOUNTER — LAB (OUTPATIENT)
Dept: LAB | Facility: CLINIC | Age: 72
End: 2022-09-20
Payer: MEDICARE

## 2022-09-20 VITALS — HEART RATE: 68 BPM | DIASTOLIC BLOOD PRESSURE: 64 MMHG | OXYGEN SATURATION: 100 % | SYSTOLIC BLOOD PRESSURE: 116 MMHG

## 2022-09-20 DIAGNOSIS — T56.0X4A TOXIC EFFECT OF LEAD AND ITS COMPOUNDS, UNDETERMINED, INITIAL ENCOUNTER: ICD-10-CM

## 2022-09-20 DIAGNOSIS — G25.89 OTHER SPECIFIED EXTRAPYRAMIDAL AND MOVEMENT DISORDERS: ICD-10-CM

## 2022-09-20 DIAGNOSIS — G62.9 NEUROPATHY: ICD-10-CM

## 2022-09-20 DIAGNOSIS — I73.81 ERYTHROMELALGIA (H): Primary | ICD-10-CM

## 2022-09-20 DIAGNOSIS — I73.81 ERYTHROMELALGIA (H): ICD-10-CM

## 2022-09-20 LAB
ALBUMIN UR-MCNC: NEGATIVE MG/DL
APPEARANCE UR: CLEAR
BACTERIA #/AREA URNS HPF: ABNORMAL /HPF
BILIRUB UR QL STRIP: NEGATIVE
COLOR UR AUTO: YELLOW
GLUCOSE UR STRIP-MCNC: NEGATIVE MG/DL
HGB UR QL STRIP: NEGATIVE
KETONES UR STRIP-MCNC: NEGATIVE MG/DL
LEUKOCYTE ESTERASE UR QL STRIP: NEGATIVE
NITRATE UR QL: NEGATIVE
PH UR STRIP: 7 [PH] (ref 5–7)
RBC URINE: 0 /HPF
SP GR UR STRIP: 1 (ref 1–1.03)
UROBILINOGEN UR STRIP-MCNC: NORMAL MG/DL
WBC URINE: <1 /HPF

## 2022-09-20 PROCEDURE — 99204 OFFICE O/P NEW MOD 45 MIN: CPT | Mod: GC

## 2022-09-20 PROCEDURE — 83655 ASSAY OF LEAD: CPT | Mod: 90 | Performed by: PATHOLOGY

## 2022-09-20 PROCEDURE — 99000 SPECIMEN HANDLING OFFICE-LAB: CPT | Performed by: PATHOLOGY

## 2022-09-20 PROCEDURE — 83825 ASSAY OF MERCURY: CPT | Mod: 90 | Performed by: PATHOLOGY

## 2022-09-20 PROCEDURE — 81001 URINALYSIS AUTO W/SCOPE: CPT | Performed by: PATHOLOGY

## 2022-09-20 RX ORDER — CLOBETASOL PROPIONATE 0.5 MG/G
CREAM TOPICAL
COMMUNITY

## 2022-09-20 RX ORDER — CARBAMAZEPINE 100 MG/1
100 TABLET, EXTENDED RELEASE ORAL 2 TIMES DAILY
Qty: 60 TABLET | Refills: 1 | Status: SHIPPED | OUTPATIENT
Start: 2022-09-20 | End: 2023-01-12 | Stop reason: ALTCHOICE

## 2022-09-20 ASSESSMENT — PAIN SCALES - PAIN ENJOYMENT GENERAL ACTIVITY SCALE (PEG)
INTERFERED_ENJOYMENT_LIFE: 4
INTERFERED_GENERAL_ACTIVITY: 10
PEG_TOTALSCORE: 7
INTERFERED_ENJOYMENT_LIFE: 4
INTERFERED_GENERAL_ACTIVITY: 10 - COMPLETELY INTERFERES
PEG_TOTALSCORE: 7
AVG_PAIN_PASTWEEK: 7
AVG_PAIN_PASTWEEK: 7

## 2022-09-20 ASSESSMENT — ANXIETY QUESTIONNAIRES
5. BEING SO RESTLESS THAT IT IS HARD TO SIT STILL: SEVERAL DAYS
8. IF YOU CHECKED OFF ANY PROBLEMS, HOW DIFFICULT HAVE THESE MADE IT FOR YOU TO DO YOUR WORK, TAKE CARE OF THINGS AT HOME, OR GET ALONG WITH OTHER PEOPLE?: NOT DIFFICULT AT ALL
GAD7 TOTAL SCORE: 8
3. WORRYING TOO MUCH ABOUT DIFFERENT THINGS: SEVERAL DAYS
GAD7 TOTAL SCORE: 8
7. FEELING AFRAID AS IF SOMETHING AWFUL MIGHT HAPPEN: SEVERAL DAYS
7. FEELING AFRAID AS IF SOMETHING AWFUL MIGHT HAPPEN: SEVERAL DAYS
4. TROUBLE RELAXING: MORE THAN HALF THE DAYS
GAD7 TOTAL SCORE: 8
2. NOT BEING ABLE TO STOP OR CONTROL WORRYING: SEVERAL DAYS
6. BECOMING EASILY ANNOYED OR IRRITABLE: SEVERAL DAYS
IF YOU CHECKED OFF ANY PROBLEMS ON THIS QUESTIONNAIRE, HOW DIFFICULT HAVE THESE PROBLEMS MADE IT FOR YOU TO DO YOUR WORK, TAKE CARE OF THINGS AT HOME, OR GET ALONG WITH OTHER PEOPLE: NOT DIFFICULT AT ALL
1. FEELING NERVOUS, ANXIOUS, OR ON EDGE: SEVERAL DAYS

## 2022-09-20 ASSESSMENT — PAIN SCALES - GENERAL: PAINLEVEL: SEVERE PAIN (7)

## 2022-09-20 NOTE — NURSING NOTE
Patient presents with:  Oncology Clinic Visit: Erythromelalgia  Follow Up: New Consult both feet and hands pain level 7/10      Severe Pain (7)     Pain Medications     Analgesic Combinations Refills Start End     butalbital-acetaminophen-caffeine (FIORICET, ESGIC) -40 MG per tablet          Sig - Route: Take 1-2 tablets by mouth as needed - Oral    Class: Historical    Salicylates Refills Start End     aspirin 81 MG tablet          Sig - Route: Take 2 tablets by mouth 2 times daily  - Oral    Class: Historical          What medications are you using for pain? Oxycodone, clobetasol propionate cream for her feet    (New patients only) Have you been seen by another pain clinic/ provider? yes    (Return Patients only) What refills are you needing today? No    Expectation Not Sure

## 2022-09-20 NOTE — PATIENT INSTRUCTIONS
Medications:    carBAMazepine (TEGRETOL XR) 100 MG 12 hr tablet. Take 1 tablet (100 mg) by mouth 2 times daily.      *Please provide the clinic with a minium of 1 week notice, on all prescription refills.       Referrals:         Adult Neurology  Referral                Recommended Follow up:      Follow up in 2 months.          Please call [phone] to schedule your clinic appointment if you don't already have an appointment scheduled.        To speak with a nurse, schedule/reschedule/cancel a clinic appointment, or request a medication refill call: [phone]    You can also reach us by [employer]: [email or web address]

## 2022-09-20 NOTE — PROGRESS NOTES
[facility] Consultation    Date of visit: 9/20/2022    Reason for consultation:    Anjali Chery is a 72 year old female who is seen in consultation today at the request of her Dermatologist, Chandan Solis    Primary Care Provider is Manuelito Simpson  Pain medications are being prescribed by PCP      Chief Complaint:    Chief Complaint   Patient presents with     Oncology Clinic Visit     Erythromelalgia     Follow Up     New Consult both feet and hands pain level 7/10       Pain history:  Anjali Chery is a 72 year old female who first started having problems with pain in the mid 2000s. First noticed redness of feet in 2019 with associated burning pain, numbness, tingling and occasional electrical shock like sensation. States pain has gotten worse over the last year. Pain occassionally radiates from feet up towards her knees. Also has paresthesia in her buttocks after sitting for prolong periods. Currently rates pain at 7/10. At best, pain is about 3/10 and a 10/10 at worse. Heat makes pain worse. Also states putting on shocks with shoes makes pain worse as well. Has had several referrals in the past to rheumatology, neurology and dermatology to work up possible etiology of pain. Has tried lyrica, gabapentin, duloxetine, baclofen, amitriptyline with no success. Got up to 300 TID on gabapentin and discontinued due to extreme drowsiness and discolored urine. States duloxetine gave her bad diarrhea. Amitriptyline did not help much and gave her dry mouth. Reports some relief on a combination of topical creams incluiding aloe vera, urea and clobetasol.     She also reports similar pain in bilateral hands. Hands occasionally get red as well with associated flushing up towards the elbow. Reports feet pain is more bothersome for her at the moment.     Pain prevents her from sleeping. And also inhibits her from performing her daily activities such as cleaning around the  house. States she gets electrical shock-like sensations in her fingers when using electronics such as TV remote or cell phone. Endorses numbness and tingling in hand and feet. Denies allodynia.     Had a short course of oxycodone for facial injuries s/p fall.    Pain rating: intensity ranges from 3/10 to 10/10, and Averages 7/10 on a 0-10 scale.  Aggravating factors include: heat, touching metals, electronics  Relieving factors include: topical creams  Any bowel or bladder incontinence: no    Current treatments include:  - Urea topical cream  - Aloe vera cream  - Cyclobenzaprine 5 mg/day  - Naproxen  - lobetasol propionate 0.05%     Previous medication treatments included:  Lyrica  Gabapentin  Amitriptyline  Baclofen  Duloxetine    Other treatments have included:  Anjali Chery has not been seen at a pain clinic in the past.  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     Peripheral vascular disease (H)      Rectal prolapse      S/P bilateral vein stripping (1975) 9/2/2015     Sjogren's syndrome (H) 6/24/2015     Venous insufficiency 9/2/2015     Patient Active Problem List    Diagnosis Date Noted     S/P bilateral vein stripping (1975) 09/02/2015     Priority: Medium     Venous insufficiency 09/02/2015     Priority: Medium     Hx of bilateral carpal tunnel repair 08/17/2015     Priority: Medium     2012       Median neuropathy, left 07/02/2015     Priority: Medium     Class: Chronic     Median neuropathy, right 07/02/2015     Priority: Medium     Class: Chronic     Sjogren's syndrome (H) 06/24/2015     Priority: Medium     Class: Chronic     Rectal prolapse 11/13/2013     Priority: Medium     Rectal prolapse (s/p rectopexy in 1999 with Dr. Schwab combined with sacral colpopexy by Dr. Mann).          Past Surgical  History:  Past Surgical History:   Procedure Laterality Date     APPENDECTOMY       CHOLECYSTECTOMY       COLECTOMY       COLONOSCOPY      multiple     rectal prolapse repair[       rectocele/enterocele repair[       RECTOSIGMOIDECTOMY PERINEAL  11/13/2013    Procedure: RECTOSIGMOIDECTOMY PERINEAL;  [facility] Procedure, Exam under anesthesia;  Surgeon: Amadou Barragan MD;  Location: [facility] OR     removal of tubes and ovaries[       sling procedure[       tumor removed right ilium[       vaginal prolapse repair[       Medications:  Current Outpatient Medications   Medication Sig Dispense Refill     aspirin 81 MG tablet Take 2 tablets by mouth 2 times daily        butalbital-acetaminophen-caffeine (FIORICET, ESGIC) -40 MG per tablet Take 1-2 tablets by mouth as needed       Calcium-Magnesium-Zinc 333-133-5 MG TABS Take by mouth daily       carBAMazepine (TEGRETOL XR) 100 MG 12 hr tablet Take 1 tablet (100 mg) by mouth 2 times daily 60 tablet 1     Cholecalciferol (VITAMIN D3 PO) Take 1,000 Units by mouth daily        clobetasol (TEMOVATE) 0.05 % CREA cream        Coenzyme Q10 (CO Q 10 PO) Take 1 capsule by mouth daily       Cyanocobalamin (B-12 PO) Take 1,000 mg by mouth daily        cyclobenzaprine (FLEXERIL) 10 MG tablet Take 10 mg by mouth 2 times daily as needed        fish oil-omega-3 fatty acids 1000 MG capsule Take 1,200 mg by mouth 2 times daily        Flaxseed, Linseed, (FLAXSEED OIL PO) Take 1,000 mg by mouth daily        folic acid (FOLVITE) 400 MCG tablet Take 1,200 mcg by mouth daily        GARLIC PO Take 1,000 mg by mouth daily        Lutein 20 MG CAPS Take 1 capsule by mouth daily       misoprostol (CYTOTEC) 200 MCG tablet Take 1 tablet (200 mcg) by mouth 2 times daily for 14 days, THEN 2 tablets (400 mcg) 2 times daily for 90 days. 388 tablet 0     UNABLE TO FIND daily MEDICATION NAME: Glucosamine Sulf/Tonio/MSM 1.5/1.2/.5       baclofen (LIORESAL) 20 MG tablet Take 20 mg by mouth 3 times daily as  needed (Patient not taking: Reported on 9/20/2022)       COMPRESSION STOCKINGS 1 each daily 20-30 mm Hg thigh or panty hose compression hosiery.  Measure and fit.  Style and brand per patient choice. (Patient not taking: Reported on 9/20/2022) 3 Units 6     DULoxetine (CYMBALTA) 30 MG capsule Take 1 capsule (30 mg) by mouth 2 times daily Take once daily for 2 weeks, then increase to 2 capsules daily (Patient not taking: Reported on 9/20/2022) 60 capsule 3     FLUoxetine (PROZAC) 10 MG capsule Take 1 capsule (10 mg) by mouth daily (Patient not taking: Reported on 9/20/2022) 15 capsule 0     gabapentin (NEURONTIN) 100 MG capsule Take 1 capsule (100 mg) by mouth 3 times daily (Patient not taking: Reported on 9/20/2022) 90 capsule 3     gabapentin (NEURONTIN) 300 MG capsule Take 1 capsule (300 mg) by mouth 3 times daily (Patient not taking: Reported on 9/20/2022) 90 capsule 3     senna-docusate (SENOKOT-S;PERICOLACE) 8.6-50 MG per tablet Take 1 tablet by mouth 2 times daily as needed for constipation (Patient not taking: Reported on 9/20/2022) 60 tablet 1     Urea 40 % CREA Externally apply topically 2 times daily (Patient not taking: Reported on 9/20/2022) 227 g 11     zolpidem (AMBIEN) 10 MG tablet Take 5 mg by mouth nightly as needed  (Patient not taking: Reported on 9/20/2022)       Allergies:     Allergies   Allergen Reactions     Estradiol Other (See Comments)     Breast tenderness       Hydroxychloroquine Nausea     Social History:  Home situation: Lives in [place] with   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  Tobacco use: never  Alcohol use: never  Drug use: never  History of chemical dependency treatment: no    Family history:  Family History   Problem Relation Age of Onset     Leukemia Mother      Brain Tumor Father      Leukemia Maternal Grandmother        Review of Systems:    POSTIVE IN BOLD  GENERAL: fever/chills, fatigue, general  unwell feeling, weight gain/loss.  HEAD/EYES:  headache, dizziness, or vision changes.    EARS/NOSE/THROAT:  Nosebleeds, hearing loss, sinus infection, earache, tinnitus.  IMMUNE:  Allergies, cancer, immune deficiency, or infections.  SKIN:  Urticaria, rash, hives  HEME/Lymphatic:   anemia, easy bruising, easy bleeding.  RESPIRATORY:  cough, wheezing, or shortness of breath  CARDIOVASCULAR/Circulation:  Extremity edema, syncope, hypertension, tachycardia, or angina.  GASTROINTESTINAL:  abdominal pain, nausea/emesis, diarrhea, constipation,  hematochezia, or melena.  ENDOCRINE:  Diabetes, steroid use,  thyroid disease or osteoporosis.  MUSCULOSKELETAL: neck pain, back pain, arthralgia, arthritis, or gout.  GENITOURINARY:  frequency, urgency, dysuria, difficulty voiding, hematuria or incontinence.  NEUROLOGIC:  weakness, numbness, paresthesias, seizure, tremor, stroke or memory loss.  PSYCHIATRIC:  depression, anxiety, stress, suicidal thoughts or mood swings.     Physical Exam:  Vitals:    09/20/22 1239   BP: 116/64   Pulse: 68   SpO2: 100%     Exam:  Constitutional: healthy, alert and no distress  Head: normocephalic. Atraumatic.   Eyes: no redness or jaundice noted  Cardiopulmonary: Breathing comfortably on room air. Non- labored.  Gastrointestinal: Deferred  : deferred  Skin: Redness of dorsal and plantar surface of feet with associated scaling of heels. Swelling in lower legs bilaterally.  Psychiatric: mentation appears normal and affect normal/bright    Musculoskeletal exam:  Gait/Station/Posture: normal gait    Neurologic exam:  CN:  Cranial nerves 2-12 are grossly normal  Motor:  5/5 UE and LE strength  Sensory:  (upper and lower extremities):   Light touch: normal    Allodynia: absent    Dysethesia: absent    Hyperalgesia: absent     Diagnostic tests:  EMG back on 2015: Unable to see results      Assessment:  1. Erythromelalgia vs Heavy metal toxicity   2. Chronic pain syndrome     Anjali Chery is a  72 year old female who presents with chronic bilateral feet and hand burning pain and associated redness and paresthesia. Has tried multiple medications in the past with no relief. Currently being worked up for possible metal toxicity vs rheumatologic disease. However has not seen a neurologist recently. Will refer patient to neurologist for neuropathic pain work up and possible EMG.     Plan:  Diagnosis reviewed, treatment option addressed, and risk/benefits discussed.  Self-care instructions given.  I am recommending a multidisciplinary treatment plan to help this patient better manage her pain.      1. Medication Management: Start Tegretol 100 mg BID  2. Further procedures recommended: neurology consult  3. Follow up: In 2 month.    I saw and examined the patient with the medical student and agree with the findings and the plan of care as documented in the resident's note.   Teofilo Ruiz IV, MD

## 2022-09-20 NOTE — LETTER
9/20/2022       RE: Anjali Chery  [address] [place] [address]     Dear Colleague,    Thank you for referring your patient, Anjali Chery, to the [facility] [place] at [facility]. Please see a copy of my visit note below.                          [facility] Consultation    Date of visit: 9/20/2022    Reason for consultation:    Anjali Chery is a 72 year old female who is seen in consultation today at the request of her Dermatologist, Chandan Solis    Primary Care Provider is Manuelito Simpson  Pain medications are being prescribed by PCP      Chief Complaint:    Chief Complaint   Patient presents with     Oncology Clinic Visit     Erythromelalgia     Follow Up     New Consult both feet and hands pain level 7/10       Pain history:  Anjali Chery is a 72 year old female who first started having problems with pain in the mid 2000s. First noticed redness of feet in 2019 with associated burning pain, numbness, tingling and occasional electrical shock like sensation. States pain has gotten worse over the last year. Pain occassionally radiates from feet up towards her knees. Also has paresthesia in her buttocks after sitting for prolong periods. Currently rates pain at 7/10. At best, pain is about 3/10 and a 10/10 at worse. Heat makes pain worse. Also states putting on shocks with shoes makes pain worse as well. Has had several referrals in the past to rheumatology, neurology and dermatology to work up possible etiology of pain. Has tried lyrica, gabapentin, duloxetine, baclofen, amitriptyline with no success. Got up to 300 TID on gabapentin and discontinued due to extreme drowsiness and discolored urine. States duloxetine gave her bad diarrhea. Amitriptyline did not help much and gave her dry mouth. Reports some relief on a combination of topical creams  incluiding aloe vera, urea and clobetasol.     She also reports similar pain in bilateral hands. Hands occasionally get red as well with associated flushing up towards the elbow. Reports feet pain is more bothersome for her at the moment.     Pain prevents her from sleeping. And also inhibits her from performing her daily activities such as cleaning around the house. States she gets electrical shock-like sensations in her fingers when using electronics such as TV remote or cell phone. Endorses numbness and tingling in hand and feet. Denies allodynia.     Had a short course of oxycodone for facial injuries s/p fall.    Pain rating: intensity ranges from 3/10 to 10/10, and Averages 7/10 on a 0-10 scale.  Aggravating factors include: heat, touching metals, electronics  Relieving factors include: topical creams  Any bowel or bladder incontinence: no    Current treatments include:  - Urea topical cream  - Aloe vera cream  - Cyclobenzaprine 5 mg/day  - Naproxen  - lobetasol propionate 0.05%     Previous medication treatments included:  Lyrica  Gabapentin  Amitriptyline  Baclofen  Duloxetine    Other treatments have included:  Anjali Chery has not been seen at a pain clinic in the past.  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     Peripheral vascular disease (H)      Rectal prolapse      S/P bilateral vein stripping (1975) 9/2/2015     Sjogren's syndrome (H) 6/24/2015     Venous insufficiency 9/2/2015     Patient Active Problem List    Diagnosis Date Noted     S/P bilateral vein stripping (1975) 09/02/2015     Priority: Medium     Venous insufficiency 09/02/2015     Priority: Medium     Hx of bilateral carpal tunnel repair 08/17/2015     Priority: Medium     2012       Median neuropathy, left 07/02/2015     Priority: Medium      Class: Chronic     Median neuropathy, right 07/02/2015     Priority: Medium     Class: Chronic     Sjogren's syndrome (H) 06/24/2015     Priority: Medium     Class: Chronic     Rectal prolapse 11/13/2013     Priority: Medium     Rectal prolapse (s/p rectopexy in 1999 with Dr. Schwab combined with sacral colpopexy by Dr. Mann).          Past Surgical History:  Past Surgical History:   Procedure Laterality Date     APPENDECTOMY       CHOLECYSTECTOMY       COLECTOMY       COLONOSCOPY      multiple     rectal prolapse repair[       rectocele/enterocele repair[       RECTOSIGMOIDECTOMY PERINEAL  11/13/2013    Procedure: RECTOSIGMOIDECTOMY PERINEAL;  [facility] Procedure, Exam under anesthesia;  Surgeon: Amadou Barragan MD;  Location: [facility] OR     removal of tubes and ovaries[       sling procedure[       tumor removed right ilium[       vaginal prolapse repair[       Medications:  Current Outpatient Medications   Medication Sig Dispense Refill     aspirin 81 MG tablet Take 2 tablets by mouth 2 times daily        butalbital-acetaminophen-caffeine (FIORICET, ESGIC) -40 MG per tablet Take 1-2 tablets by mouth as needed       Calcium-Magnesium-Zinc 333-133-5 MG TABS Take by mouth daily       carBAMazepine (TEGRETOL XR) 100 MG 12 hr tablet Take 1 tablet (100 mg) by mouth 2 times daily 60 tablet 1     Cholecalciferol (VITAMIN D3 PO) Take 1,000 Units by mouth daily        clobetasol (TEMOVATE) 0.05 % CREA cream        Coenzyme Q10 (CO Q 10 PO) Take 1 capsule by mouth daily       Cyanocobalamin (B-12 PO) Take 1,000 mg by mouth daily        cyclobenzaprine (FLEXERIL) 10 MG tablet Take 10 mg by mouth 2 times daily as needed        fish oil-omega-3 fatty acids 1000 MG capsule Take 1,200 mg by mouth 2 times daily        Flaxseed, Linseed, (FLAXSEED OIL PO) Take 1,000 mg by mouth daily        folic acid (FOLVITE) 400 MCG tablet Take 1,200 mcg by mouth daily        GARLIC PO Take 1,000 mg by mouth daily        Lutein 20 MG  CAPS Take 1 capsule by mouth daily       misoprostol (CYTOTEC) 200 MCG tablet Take 1 tablet (200 mcg) by mouth 2 times daily for 14 days, THEN 2 tablets (400 mcg) 2 times daily for 90 days. 388 tablet 0     UNABLE TO FIND daily MEDICATION NAME: Glucosamine Sulf/Tonio/MSM 1.5/1.2/.5       baclofen (LIORESAL) 20 MG tablet Take 20 mg by mouth 3 times daily as needed (Patient not taking: Reported on 9/20/2022)       COMPRESSION STOCKINGS 1 each daily 20-30 mm Hg thigh or panty hose compression hosiery.  Measure and fit.  Style and brand per patient choice. (Patient not taking: Reported on 9/20/2022) 3 Units 6     DULoxetine (CYMBALTA) 30 MG capsule Take 1 capsule (30 mg) by mouth 2 times daily Take once daily for 2 weeks, then increase to 2 capsules daily (Patient not taking: Reported on 9/20/2022) 60 capsule 3     FLUoxetine (PROZAC) 10 MG capsule Take 1 capsule (10 mg) by mouth daily (Patient not taking: Reported on 9/20/2022) 15 capsule 0     gabapentin (NEURONTIN) 100 MG capsule Take 1 capsule (100 mg) by mouth 3 times daily (Patient not taking: Reported on 9/20/2022) 90 capsule 3     gabapentin (NEURONTIN) 300 MG capsule Take 1 capsule (300 mg) by mouth 3 times daily (Patient not taking: Reported on 9/20/2022) 90 capsule 3     senna-docusate (SENOKOT-S;PERICOLACE) 8.6-50 MG per tablet Take 1 tablet by mouth 2 times daily as needed for constipation (Patient not taking: Reported on 9/20/2022) 60 tablet 1     Urea 40 % CREA Externally apply topically 2 times daily (Patient not taking: Reported on 9/20/2022) 227 g 11     zolpidem (AMBIEN) 10 MG tablet Take 5 mg by mouth nightly as needed  (Patient not taking: Reported on 9/20/2022)       Allergies:     Allergies   Allergen Reactions     Estradiol Other (See Comments)     Breast tenderness       Hydroxychloroquine Nausea     Social History:  Home situation: Lives in [place] with   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  HEAD/EYES:  headache, dizziness, or vision changes.    EARS/NOSE/THROAT:  Nosebleeds, hearing loss, sinus infection, earache, tinnitus.  IMMUNE:  Allergies, cancer, immune deficiency, or infections.  SKIN:  Urticaria, rash, hives  HEME/Lymphatic:   anemia, easy bruising, easy bleeding.  RESPIRATORY:  cough, wheezing, or shortness of breath  CARDIOVASCULAR/Circulation:  Extremity edema, syncope, hypertension, tachycardia, or angina.  GASTROINTESTINAL:  abdominal pain, nausea/emesis, diarrhea, constipation,  hematochezia, or melena.  ENDOCRINE:  Diabetes, steroid use,  thyroid disease or osteoporosis.  MUSCULOSKELETAL: neck pain, back pain, arthralgia, arthritis, or gout.  GENITOURINARY:  frequency, urgency, dysuria, difficulty voiding, hematuria or incontinence.  NEUROLOGIC:  weakness, numbness, paresthesias, seizure, tremor, stroke or memory loss.  PSYCHIATRIC:  depression, anxiety, stress, suicidal thoughts or mood swings.     Physical Exam:  Vitals:    09/20/22 1239   BP: 116/64   Pulse: 68   SpO2: 100%     Exam:  Constitutional: healthy, alert and no distress  Head: normocephalic. Atraumatic.   Eyes: no redness or jaundice noted  Cardiopulmonary: Breathing comfortably on room air. Non- labored.  Gastrointestinal: Deferred  : deferred  Skin: Redness of dorsal and plantar surface of feet with associated scaling of heels. Swelling in lower legs bilaterally.  Psychiatric: mentation appears normal and affect normal/bright    Musculoskeletal exam:  Gait/Station/Posture: normal gait    Neurologic exam:  CN:  Cranial nerves 2-12 are  grossly normal  Motor:  5/5 UE and LE strength  Sensory:  (upper and lower extremities):   Light touch: normal    Allodynia: absent    Dysethesia: absent    Hyperalgesia: absent     Diagnostic tests:  EMG back on 2015: Unable to see results      Assessment:  1. Erythromelalgia vs Heavy metal toxicity   2. Chronic pain syndrome     Anjali Chery is a 72 year old female who presents with chronic bilateral feet and hand burning pain and associated redness and paresthesia. Has tried multiple medications in the past with no relief. Currently being worked up for possible metal toxicity vs rheumatologic disease. However has not seen a neurologist recently. Will refer patient to neurologist for neuropathic pain work up and possible EMG.     Plan:  Diagnosis reviewed, treatment option addressed, and risk/benefits discussed.  Self-care instructions given.  I am recommending a multidisciplinary treatment plan to help this patient better manage her pain.      1. Medication Management: Start Tegretol 100 mg BID  2. Further procedures recommended: neurology consult  3. Follow up: In 2 month.    I saw and examined the patient with the medical student and agree with the findings and the plan of care as documented in the resident's note.     Teofilo Ruiz IV, MD

## 2022-09-22 ENCOUNTER — OFFICE VISIT (OUTPATIENT)
Dept: DERMATOLOGY | Facility: CLINIC | Age: 72
End: 2022-09-22
Payer: MEDICARE

## 2022-09-22 DIAGNOSIS — G62.9 NEUROPATHY: Primary | ICD-10-CM

## 2022-09-22 DIAGNOSIS — I73.81 ERYTHROMELALGIA (H): ICD-10-CM

## 2022-09-22 PROCEDURE — 99215 OFFICE O/P EST HI 40 MIN: CPT | Performed by: DERMATOLOGY

## 2022-09-22 RX ORDER — BISOPROLOL FUMARATE 5 MG/1
2.5 TABLET, FILM COATED ORAL DAILY
COMMUNITY
Start: 2022-07-29

## 2022-09-22 RX ORDER — METHYLDOPA/HYDROCHLOROTHIAZIDE 250MG-25MG
TABLET ORAL
COMMUNITY

## 2022-09-22 RX ORDER — NAPROXEN 25 MG/ML
SUSPENSION ORAL 2 TIMES DAILY
COMMUNITY

## 2022-09-22 RX ORDER — BIOTIN 10000 MCG
CAPSULE ORAL
COMMUNITY

## 2022-09-22 ASSESSMENT — PAIN SCALES - GENERAL: PAINLEVEL: MODERATE PAIN (5)

## 2022-09-22 NOTE — PROGRESS NOTES
[facility] Dermatology Note  Encounter Date: Sep 22, 2022  Office Visit     Dermatology Problem List:  1. Pain and redness of feet and dorsal hands:  - Unclear etiology; ddx includes small fiber neuropathy, complex regional pain syndrome, erythromelalgia   - Initial onset ~2013  - Current tx: naproxen, oxycodone, clobetasol 0.05% ointment, CeraVe with pramoxine, aloe vera, gold bond with urea  - Past tx: triamcinolone 0.1% ointment, mometasone, pregabalin, gabapentin, duloxetine, amitriptyline, 40% urea cream  - Future: repeat biopsy/EM      2. Suspected Sjogren syndrome with history of inflammatory arthritis and chronic pain  - Follows rheum (first seen ~1999)  - Past tx: MTX (~2014), prednisone, Plaquenil (several years, stopped due to lack of efficacy and also nausea)    ____________________________________________    Assessment & Plan:    1. Pain and redness of feet and dorsal hands:  Unclear etiology, ddx includes small fiber neuropathy, erythromelalgia, complex regional pain syndrome, heavy metal poisoning.  Her pain is more diffuse than would be expected with solely erythromelalgia. In addition, she has exhibited somewhat slowed speech, tangential responses, and appeared to have difficulty with processing; this could be independent and related to early dementia or indicative of an underlying unifying cause such as heavy metal poisoning. Serum mercury levels have been WNL, and urine testing for lead and mercury were obtained two days ago (9/20/22) and are pending. She also has supraphysiologic levels of B6 and B12, and takes many supplements, which could contain other unknown components including heavy metals. The patient also has a history of multiple abnormal EMGs, most recently in 2015. I feel it would be a good idea to have a repeat EMG and follow-up with neurology (referral already placed by pain medicine)    Prior treatments have included pregabalin, gabapentin, duloxetine, baclofen,  and amitriptyline. Duloxetine was stopped due to GI affects, gabapentin was unable to be tolerated due to drowsiness, and amitriptyline resulted in dry mouth. She saw pain medicine (anesthesiology) 9/20/22 and was recommended carbamazapine 100 mg BID, but patient did not want to start due to concerns of side effects. The patient also did not want to use misoprostol (which I have recommended in the past) due to concern of side effects. She was recently given clobetasol 0.05% ointment by her outside dermatologist. This is a low risk medication, so I am okay with her continuing use. However, my impression is that this is more likely a systemic problem and topical clobetasol may be of limited overall and long term benefit. Other topicals she is using include aloe vera lotions and OTC creams with urea.    We discussed the possibility of a biopsy, but patient remains resistant to this idea. At this point, I feel waiting on biopsy is reasonable as it may not aid in diagnosis and does have the risk of poor wound healing considering the location on her edematous feet. Given her lower extremity edema, I would recommend following up with her PCP to discuss beginning a diuretic.    At this point I do not feel making major systemic medication changes are indicated considering their resistance to systemic medications due to concern of side effects. However, I will recommend beginning a topical gabapentin.     Patients is also wondering about use of opioids, CBD, and medical marijuana. She was previously prescribed oxycodone by her PCP, but I discussed that given the likely neuropathic nature of this pain, opioids may not be the best treatment options. I also discussed that medical marijuana is not something I typically prescribe, but looking further into this and keeping our mind open about treatment options is not an unreasonable option and she can discuss this further with neurology or pain  management.    PLAN/RECOMMENDATIONS  -Follow up with neurology (priority referral placed) for evaluation and repeat EMG  -Begin topical gabapentin 8% cream BID to hands and feet (prescription ordered)  -Future systemic medications to consider include: mexiletine, phenytoin, or CGRP inhibitor, pending neurology evaluation  -Follow-up with PCP for edema/fluid management      Follow-up: 2-3 months in-person, preferably after EMG and neurology evaluation    Staff and Medical Student:     John Calhoun, MS4    Staff Physician:  I was present with the medical student who participated in the service and in the documentation of the note. I have verified the history and personally performed the physical exam and medical decision making. I edited the assessment and plan of care as documented in the note.     Over 55 minutes was spent during this visit on the date of service, including direct contact time with the patient counseling and coordinating care, review of the medical records, and documentation of diagnosis, natural history, treatment, and prognosis. This excludes time performing any relevant procedures.    Chandan Solis MD   of Dermatology  Department of Dermatology  [facility] [employer]  ____________________________________________    CC: No chief complaint on file.    HPI:  Ms. Anjali Chery is a(n) 72 year old female who presents today as a return patient for chronic hand and foot pain and redness. She feels symptoms are stable but unimproved since last visit on 7/21/22.     She has tried a number of prior treatments for neuropathic pain including pregabalin, gabapentin, duloxetine, baclofen, and amitriptyline. Duloxetine was stopped due to GI affects, gabapentin was unable to be tolerated due to drowsiness, and amitriptyline resulted in dry mouth. She recently saw pain medicine (anesthesiology) 9/20/22 and was recommended to begin Carbamazapine 100 mg BID, but  patient did not want to start due to concerns of side effect. The patient also did not want to use misoprostol (which I have recommended in the past) due to concern of side effects. Last week she was given clobetasol 0.05% ointment by her outside dermatologist, which she feels is beneficial and provides relief.  Other topicals she is currently using include aloe vera lotions and OTC creams with Urea (goldbond). The only current systemic pain medications she uses are naproxen. She was also using oxycodone prescribed by her PCP but has run out of this prescription.    Today her pain in the hands is rated 8/10 and her feet is rated 4/10. She describes the pain as burning and aching. It is not tender to the touch or worsened by walking, but it is worsened by warm water. She also notes that she got up in the middle of the night on 8/29 and fell and had to spend two nights in the hospital. Since the hospitalization, she feels the swelling in her lower extremities has worsened.    Patient denies additional skin concerns.    ROS: 12 point review of systems negative other than as per HPI    Labs:  UA, CBC, CMP, Mg, Serum Mercury, B6, B12, CRP, ESR reviewed.    Physical Exam:  Vitals: There were no vitals taken for this visit.  SKIN: Focused examination of the hands, feet, and lower extremities was performed.  - Red erythema of the bilateral feet, most noticeable on the lateral and plantar surfaces along with the dorsal surfaces of the toes.  Bilateral plantar and lateral feet with hyperkeratotic plaques and fissuring  - Pitting edema of the bilateral lower extremitites  - No other lesions of concern on areas examined.     Medications:  Current Outpatient Medications   Medication     aspirin 81 MG tablet     baclofen (LIORESAL) 20 MG tablet     butalbital-acetaminophen-caffeine (FIORICET, ESGIC) -40 MG per tablet     Calcium-Magnesium-Zinc 333-133-5 MG TABS     carBAMazepine (TEGRETOL XR) 100 MG 12 hr tablet      Cholecalciferol (VITAMIN D3 PO)     clobetasol (TEMOVATE) 0.05 % CREA cream     Coenzyme Q10 (CO Q 10 PO)     COMPRESSION STOCKINGS     Cyanocobalamin (B-12 PO)     cyclobenzaprine (FLEXERIL) 10 MG tablet     DULoxetine (CYMBALTA) 30 MG capsule     fish oil-omega-3 fatty acids 1000 MG capsule     Flaxseed, Linseed, (FLAXSEED OIL PO)     FLUoxetine (PROZAC) 10 MG capsule     folic acid (FOLVITE) 400 MCG tablet     gabapentin (NEURONTIN) 100 MG capsule     gabapentin (NEURONTIN) 300 MG capsule     GARLIC PO     Lutein 20 MG CAPS     misoprostol (CYTOTEC) 200 MCG tablet     senna-docusate (SENOKOT-S;PERICOLACE) 8.6-50 MG per tablet     UNABLE TO FIND     Urea 40 % CREA     zolpidem (AMBIEN) 10 MG tablet     No current facility-administered medications for this visit.      Past Medical History:   Patient Active Problem List   Diagnosis     Rectal prolapse     Median neuropathy, left     Median neuropathy, right     Sjogren's syndrome (H)     Hx of bilateral carpal tunnel repair     S/P bilateral vein stripping (1975)     Venous insufficiency     Past Medical History:   Diagnosis Date     Back disorder      History of colonic polyps      Hyperlipidemia      Median neuropathy, 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     Peripheral vascular disease (H)      Rectal prolapse      S/P bilateral vein stripping (1975) 9/2/2015     Sjogren's syndrome (H) 6/24/2015     Venous insufficiency 9/2/2015        CC Chilango Dick MD  [address] [place] [id number] on close of this encounter.

## 2022-09-22 NOTE — NURSING NOTE
Dermatology Rooming Note    Anjali Chery's goals for this visit include:   Chief Complaint   Patient presents with     Derm Problem     Anjali is here today for a recheck. States she has had a fall since her last visit.     Erendira Villalba, [facility]

## 2022-09-22 NOTE — LETTER
9/22/2022       RE: Anjali Chery  [address] [place] [address]     Dear Colleague,    Thank you for referring your patient, Anjali Chery, to the [facility] [place] at [facility]. Please see a copy of my visit note below.    [facility] Dermatology Note  Encounter Date: Sep 22, 2022  Office Visit     Dermatology Problem List:  1. Pain and redness of feet and dorsal hands:  - Unclear etiology; ddx includes small fiber neuropathy, complex regional pain syndrome, erythromelalgia   - Initial onset ~2013  - Current tx: naproxen, oxycodone, clobetasol 0.05% ointment, CeraVe with pramoxine, aloe vera, gold bond with urea  - Past tx: triamcinolone 0.1% ointment, mometasone, pregabalin, gabapentin, duloxetine, amitriptyline, 40% urea cream  - Future: repeat biopsy/EM      2. Suspected Sjogren syndrome with history of inflammatory arthritis and chronic pain  - Follows rheum (first seen ~1999)  - Past tx: MTX (~2014), prednisone, Plaquenil (several years, stopped due to lack of efficacy and also nausea)    ____________________________________________    Assessment & Plan:    1. Pain and redness of feet and dorsal hands:  Unclear etiology, ddx includes small fiber neuropathy, erythromelalgia, complex regional pain syndrome, heavy metal poisoning.  Her pain is more diffuse than would be expected with solely erythromelalgia. In addition, she has exhibited somewhat slowed speech, tangential responses, and appeared to have difficulty with processing; this could be independent and related to early dementia or indicative of an underlying unifying cause such as heavy metal poisoning. Serum mercury levels have been WNL, and urine testing for lead and mercury were obtained two days ago (9/20/22) and are pending. She also has supraphysiologic levels of B6 and B12, and takes many supplements, which could contain  other unknown components including heavy metals. The patient also has a history of multiple abnormal EMGs, most recently in 2015. I feel it would be a good idea to have a repeat EMG and follow-up with neurology (referral already placed by pain medicine)    Prior treatments have included pregabalin, gabapentin, duloxetine, baclofen, and amitriptyline. Duloxetine was stopped due to GI affects, gabapentin was unable to be tolerated due to drowsiness, and amitriptyline resulted in dry mouth. She saw pain medicine (anesthesiology) 9/20/22 and was recommended carbamazapine 100 mg BID, but patient did not want to start due to concerns of side effects. The patient also did not want to use misoprostol (which I have recommended in the past) due to concern of side effects. She was recently given clobetasol 0.05% ointment by her outside dermatologist. This is a low risk medication, so I am okay with her continuing use. However, my impression is that this is more likely a systemic problem and topical clobetasol may be of limited overall and long term benefit. Other topicals she is using include aloe vera lotions and OTC creams with urea.    We discussed the possibility of a biopsy, but patient remains resistant to this idea. At this point, I feel waiting on biopsy is reasonable as it may not aid in diagnosis and does have the risk of poor wound healing considering the location on her edematous feet. Given her lower extremity edema, I would recommend following up with her PCP to discuss beginning a diuretic.    At this point I do not feel making major systemic medication changes are indicated considering their resistance to systemic medications due to concern of side effects. However, I will recommend beginning a topical gabapentin.     Patients is also wondering about use of opioids, CBD, and medical marijuana. She was previously prescribed oxycodone by her PCP, but I discussed that given the likely neuropathic nature of this  pain, opioids may not be the best treatment options. I also discussed that medical marijuana is not something I typically prescribe, but looking further into this and keeping our mind open about treatment options is not an unreasonable option and she can discuss this further with neurology or pain management.    PLAN/RECOMMENDATIONS  -Follow up with neurology (priority referral placed) for evaluation and repeat EMG  -Begin topical gabapentin 8% cream BID to hands and feet (prescription ordered)  -Future systemic medications to consider include: mexiletine, phenytoin, or CGRP inhibitor, pending neurology evaluation  -Follow-up with PCP for edema/fluid management      Follow-up: 2-3 months in-person, preferably after EMG and neurology evaluation    Staff and Medical Student:     John Calhoun, MS4    Staff Physician:  I was present with the medical student who participated in the service and in the documentation of the note. I have verified the history and personally performed the physical exam and medical decision making. I edited the assessment and plan of care as documented in the note.     Over 55 minutes was spent during this visit on the date of service, including direct contact time with the patient counseling and coordinating care, review of the medical records, and documentation of diagnosis, natural history, treatment, and prognosis. This excludes time performing any relevant procedures.    Chandan Solis MD   of Dermatology  Department of Dermatology  [facility] [employer]  ____________________________________________    CC: No chief complaint on file.    HPI:  Ms. Anjali Chery is a(n) 72 year old female who presents today as a return patient for chronic hand and foot pain and redness. She feels symptoms are stable but unimproved since last visit on 7/21/22.     She has tried a number of prior treatments for neuropathic pain including pregabalin, gabapentin,  duloxetine, baclofen, and amitriptyline. Duloxetine was stopped due to GI affects, gabapentin was unable to be tolerated due to drowsiness, and amitriptyline resulted in dry mouth. She recently saw pain medicine (anesthesiology) 9/20/22 and was recommended to begin Carbamazapine 100 mg BID, but patient did not want to start due to concerns of side effect. The patient also did not want to use misoprostol (which I have recommended in the past) due to concern of side effects. Last week she was given clobetasol 0.05% ointment by her outside dermatologist, which she feels is beneficial and provides relief.  Other topicals she is currently using include aloe vera lotions and OTC creams with Urea (goldbond). The only current systemic pain medications she uses are naproxen. She was also using oxycodone prescribed by her PCP but has run out of this prescription.    Today her pain in the hands is rated 8/10 and her feet is rated 4/10. She describes the pain as burning and aching. It is not tender to the touch or worsened by walking, but it is worsened by warm water. She also notes that she got up in the middle of the night on 8/29 and fell and had to spend two nights in the hospital. Since the hospitalization, she feels the swelling in her lower extremities has worsened.    Patient denies additional skin concerns.    ROS: 12 point review of systems negative other than as per HPI    Labs:  UA, CBC, CMP, Mg, Serum Mercury, B6, B12, CRP, ESR reviewed.    Physical Exam:  Vitals: There were no vitals taken for this visit.  SKIN: Focused examination of the hands, feet, and lower extremities was performed.  - Red erythema of the bilateral feet, most noticeable on the lateral and plantar surfaces along with the dorsal surfaces of the toes.  Bilateral plantar and lateral feet with hyperkeratotic plaques and fissuring  - Pitting edema of the bilateral lower extremitites  - No other lesions of concern on areas examined.      Medications:  Current Outpatient Medications   Medication     aspirin 81 MG tablet     baclofen (LIORESAL) 20 MG tablet     butalbital-acetaminophen-caffeine (FIORICET, ESGIC) -40 MG per tablet     Calcium-Magnesium-Zinc 333-133-5 MG TABS     carBAMazepine (TEGRETOL XR) 100 MG 12 hr tablet     Cholecalciferol (VITAMIN D3 PO)     clobetasol (TEMOVATE) 0.05 % CREA cream     Coenzyme Q10 (CO Q 10 PO)     COMPRESSION STOCKINGS     Cyanocobalamin (B-12 PO)     cyclobenzaprine (FLEXERIL) 10 MG tablet     DULoxetine (CYMBALTA) 30 MG capsule     fish oil-omega-3 fatty acids 1000 MG capsule     Flaxseed, Linseed, (FLAXSEED OIL PO)     FLUoxetine (PROZAC) 10 MG capsule     folic acid (FOLVITE) 400 MCG tablet     gabapentin (NEURONTIN) 100 MG capsule     gabapentin (NEURONTIN) 300 MG capsule     GARLIC PO     Lutein 20 MG CAPS     misoprostol (CYTOTEC) 200 MCG tablet     senna-docusate (SENOKOT-S;PERICOLACE) 8.6-50 MG per tablet     UNABLE TO FIND     Urea 40 % CREA     zolpidem (AMBIEN) 10 MG tablet     No current facility-administered medications for this visit.      Past Medical History:   Patient Active Problem List   Diagnosis     Rectal prolapse     Median neuropathy, left     Median neuropathy, right     Sjogren's syndrome (H)     Hx of bilateral carpal tunnel repair     S/P bilateral vein stripping (1975)     Venous insufficiency     Past Medical History:   Diagnosis Date     Back disorder      History of colonic polyps      Hyperlipidemia      Median neuropathy, 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     Peripheral vascular disease (H)      Rectal prolapse      S/P bilateral vein stripping (1975) 9/2/2015     Sjogren's syndrome (H) 6/24/2015     Venous insufficiency 9/2/2015        CC Chilango Dick MD  [address] [place] [address] on close of this encounter.

## 2022-09-25 LAB
CREAT 24H UR-MRATE: NORMAL MG/D
CREAT 24H UR-MRATE: NORMAL MG/D
CREAT UR-MCNC: 24 MG/DL
CREAT UR-MCNC: 24 MG/DL
LEAD 24H UR-MRATE: NORMAL UG/D
LEAD UR-MCNC: <5 UG/L
LEAD/CREAT UR: NORMAL UG/G CRT
MERCURY 24H UR-MRATE: NORMAL UG/D
MERCURY UR-MCNC: <2.5 UG/L
MERCURY/CREAT UR: NORMAL UG/G CRT

## 2022-09-28 ENCOUNTER — TELEPHONE (OUTPATIENT)
Dept: DERMATOLOGY | Facility: CLINIC | Age: 72
End: 2022-09-28

## 2022-09-28 NOTE — TELEPHONE ENCOUNTER
[facility] Call Center    Phone Message    May a detailed message be left on voicemail: yes     Reason for Call: Other: Patient missed a call today with results from her test.     Action Taken: Message routed to:  [facility] Derm    Travel Screening: Not Applicable

## 2022-09-29 NOTE — TELEPHONE ENCOUNTER
Pt's  called regarding below. I was unable to connect them. Please call them back. They will be available today. If you're calling tomorrow, please call after 1:30pm. Thanks

## 2022-09-29 NOTE — TELEPHONE ENCOUNTER
RN called patient back and was unable to reach her, so RN left callback number to discuss results.    Luciana ADAMS RN

## 2022-09-29 NOTE — TELEPHONE ENCOUNTER
Rn spoke with patient's , Moshe, and let him know both the lead and mercury urine levels came back normal.    Luciana ADAMS RN

## 2022-11-03 ENCOUNTER — VIRTUAL VISIT (OUTPATIENT)
Dept: DERMATOLOGY | Facility: CLINIC | Age: 72
End: 2022-11-03
Payer: MEDICARE

## 2022-11-03 DIAGNOSIS — I73.81 ERYTHROMELALGIA (H): Primary | ICD-10-CM

## 2022-11-03 DIAGNOSIS — L11.1 GROVER'S DISEASE: ICD-10-CM

## 2022-11-03 PROCEDURE — 99214 OFFICE O/P EST MOD 30 MIN: CPT | Mod: 95 | Performed by: DERMATOLOGY

## 2022-11-03 RX ORDER — TRIAMCINOLONE ACETONIDE 1 MG/ML
LOTION TOPICAL 3 TIMES DAILY
Qty: 180 ML | Refills: 5 | Status: SHIPPED | OUTPATIENT
Start: 2022-11-03

## 2022-11-03 RX ORDER — FUROSEMIDE 20 MG
20 TABLET ORAL EVERY MORNING
COMMUNITY
Start: 2022-11-02

## 2022-11-03 RX ORDER — HYDROXYZINE HYDROCHLORIDE 25 MG/1
25 TABLET, FILM COATED ORAL
COMMUNITY
Start: 2022-10-17

## 2022-11-03 ASSESSMENT — PAIN SCALES - GENERAL: PAINLEVEL: EXTREME PAIN (8)

## 2022-11-03 NOTE — NURSING NOTE
Dermatology Rooming Note    Anjali Chery's goals for this visit include:   Chief Complaint   Patient presents with     Derm Problem     Erythromelagia - Anjali states she has been flaring on her back and feels she is flaring on her shoulders     Erendira Villalba, VELIA

## 2022-11-03 NOTE — LETTER
11/3/2022       RE: Anjali Chery  [address] [place] [address]     Dear Colleague,    Thank you for referring your patient, Anjali Chery, to the [facility] [place] at [facility]. Please see a copy of my visit note below.                                    [facility] Dermatology Note  Encounter Date: Nov 3, 2022  Store-and-Forward and Telephone ([phone]). Location of teledermatologist: [facility] [place].  Start time: 3:37. End time: 4:03.    Dermatology Problem List:  1. Pain and redness of feet and dorsal hands:  - Unclear etiology; ddx includes small fiber neuropathy, complex regional pain syndrome, erythromelalgia   - Initial onset ~2013  - Current tx: naproxen, oxycodone, clobetasol 0.05% ointment, CeraVe with pramoxine, aloe vera, gold bond with urea  - Past tx: triamcinolone 0.1% ointment, mometasone, pregabalin, gabapentin, duloxetine, amitriptyline, 40% urea cream, topical gabapentin, prescribed carbamazepine but didn't take (concern for side effects)  - Future: repeat biopsy/EMG      2. Suspected Sjogren syndrome with history of inflammatory arthritis and chronic pain  - Follows rheum (first seen ~1999)  - Past tx: MTX (~2014), prednisone, hydroxychloroquine (several years, stopped due to lack of efficacy and also nausea)       ____________________________________________    Assessment & Plan:     1. Erythromelalgia/neuropathic pain: symptoms remain uncontrolled with topical gabapentin and seem to be worsening. We discussed risks/benefits of several potential therapeutic options, with the understanding that there is little data to support them, including misoprostol, carbamazepine, phenytoin, rimegepant. After this discussion, patient would like to try rimegepant. She remains concerned about potential side effects of misoprostol and carbamazepine  and is not willing to trial these medications. Has upcoming neurology appointment, which will hopefully be helpful as her symptoms are primarily neuropathic rather than primarily cutaneous.  - rimegepant 75 mg daily    2. Eruption on the back: appears clinically consistent with Carmel's however photo examination somewhat limiting. Will start triamcinolone lotion and plan for closer examination at next in-person visit.  - triamcinolone 0.1% lotion BID    Procedures Performed:    None    Follow-up: 3-4 months    Staff:     Over 25 minutes was spent during this visit on the date of service, including direct contact time with the patient counseling and coordinating care, review of the medical records, and documentation of diagnosis, natural history, treatment, and prognosis. This excludes time performing any relevant procedures.      Chandan Solis MD, FAAD   of Dermatology  Department of Dermatology  [facility] [employer]    ____________________________________________    CC: Derm Problem (Erythromelagia - Anjali states she has been flaring on her back and feels she is flaring on her shoulders)    HPI:  Ms. Anjali Chery is a(n) 72 year old female who presents today as a return patient for erythromelalgia    Erythromelalgia - back and shoulders now involved  - pain and itching are increasing  - now hurts to touch - since September  - hydrocortisone helps with itching, hydroxyzine also helpful  - topical gabapentin - not helping    Patient is otherwise feeling well, without additional skin concerns.    Labs Reviewed:  N/A    Physical Exam:  Vitals: There were no vitals taken for this visit.  SKIN: Teledermatology photos were reviewed; image quality and interpretability: acceptable. Image date: 11/3/22.  - erythema and edema on the feet  - numerous erythematous crusted papules on the back  - No other lesions of concern on areas examined.     Medications:  Current  Outpatient Medications   Medication     aspirin 81 MG tablet     Biotin 10 MG CAPS     bisoprolol (ZEBETA) 5 MG tablet     butalbital-acetaminophen-caffeine (FIORICET, ESGIC) -40 MG per tablet     Calcium-Magnesium-Zinc 333-133-5 MG TABS     Cholecalciferol (VITAMIN D3 PO)     clobetasol (TEMOVATE) 0.05 % CREA cream     Coenzyme Q10 (CO Q 10 PO)     Cyanocobalamin (B-12 PO)     fish oil-omega-3 fatty acids 1000 MG capsule     Flaxseed, Linseed, (FLAXSEED OIL PO)     folic acid (FOLVITE) 400 MCG tablet     furosemide (LASIX) 20 MG tablet     GARLIC PO     Ginkgo Biloba (GINKOBA PO)     hydrOXYzine (ATARAX) 25 MG tablet     Lutein 20 MG CAPS     naproxen (NAPROSYN) 125 MG/5ML suspension     omeprazole (PRILOSEC) 20 MG DR capsule     Sodium Hyaluronate (oral) (HYALURONIC ACID) 100 MG CAPS     UNABLE TO FIND     carBAMazepine (TEGRETOL XR) 100 MG 12 hr tablet     COMPRESSION STOCKINGS     cyclobenzaprine (FLEXERIL) 10 MG tablet     GABAPENTIN 8%/VANICREAM, FV COMPOUNDED, CREAM     senna-docusate (SENOKOT-S;PERICOLACE) 8.6-50 MG per tablet     Urea 40 % CREA     zolpidem (AMBIEN) 10 MG tablet     No current facility-administered medications for this visit.      Past Medical/Surgical History:   Patient Active Problem List   Diagnosis     Rectal prolapse     Median neuropathy, left     Median neuropathy, right     Sjogren's syndrome (H)     Hx of bilateral carpal tunnel repair     S/P bilateral vein stripping (1975)     Venous insufficiency     Past Medical History:   Diagnosis Date     Back disorder      History of colonic polyps      Hyperlipidemia      Median neuropathy, 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     Peripheral vascular disease (H)      Rectal prolapse      S/P bilateral vein stripping (1975) 9/2/2015     Sjogren's syndrome (H) 6/24/2015     Venous insufficiency 9/2/2015       CC Chilango Dick MD  [address] [place] [phone] on close of this encounter.

## 2022-11-03 NOTE — PROGRESS NOTES
[facility] Dermatology Note  Encounter Date: Nov 3, 2022  Store-and-Forward and Telephone ([phone]). Location of teledermatologist: [facility] [place].  Start time: 3:37. End time: 4:03.    Dermatology Problem List:  1. Pain and redness of feet and dorsal hands:  - Unclear etiology; ddx includes small fiber neuropathy, complex regional pain syndrome, erythromelalgia   - Initial onset ~2013  - Current tx: naproxen, oxycodone, clobetasol 0.05% ointment, CeraVe with pramoxine, aloe vera, gold bond with urea  - Past tx: triamcinolone 0.1% ointment, mometasone, pregabalin, gabapentin, duloxetine, amitriptyline, 40% urea cream, topical gabapentin, prescribed carbamazepine but didn't take (concern for side effects)  - Future: repeat biopsy/EMG      2. Suspected Sjogren syndrome with history of inflammatory arthritis and chronic pain  - Follows rheum (first seen ~1999)  - Past tx: MTX (~2014), prednisone, hydroxychloroquine (several years, stopped due to lack of efficacy and also nausea)       ____________________________________________    Assessment & Plan:     1. Erythromelalgia/neuropathic pain: symptoms remain uncontrolled with topical gabapentin and seem to be worsening. We discussed risks/benefits of several potential therapeutic options, with the understanding that there is little data to support them, including misoprostol, carbamazepine, phenytoin, rimegepant. After this discussion, patient would like to try rimegepant. She remains concerned about potential side effects of misoprostol and carbamazepine and is not willing to trial these medications. Has upcoming neurology appointment, which will hopefully be helpful as her symptoms are primarily neuropathic rather than primarily cutaneous.  - rimegepant 75 mg daily    2. Eruption on the back: appears clinically consistent with Darnell's however photo examination somewhat limiting. Will start triamcinolone lotion  and plan for closer examination at next in-person visit.  - triamcinolone 0.1% lotion BID    Procedures Performed:    None    Follow-up: 3-4 months    Staff:     Over 25 minutes was spent during this visit on the date of service, including direct contact time with the patient counseling and coordinating care, review of the medical records, and documentation of diagnosis, natural history, treatment, and prognosis. This excludes time performing any relevant procedures.      Chandan Solis MD, FAAD   of Dermatology  Department of Dermatology  [facility] [employer]    ____________________________________________    CC: Derm Problem (Erythromelagia - Anjali states she has been flaring on her back and feels she is flaring on her shoulders)    HPI:  Ms. Anjali Chery is a(n) 72 year old female who presents today as a return patient for erythromelalgia    Erythromelalgia - back and shoulders now involved  - pain and itching are increasing  - now hurts to touch - since September  - hydrocortisone helps with itching, hydroxyzine also helpful  - topical gabapentin - not helping    Patient is otherwise feeling well, without additional skin concerns.    Labs Reviewed:  N/A    Physical Exam:  Vitals: There were no vitals taken for this visit.  SKIN: Teledermatology photos were reviewed; image quality and interpretability: acceptable. Image date: 11/3/22.  - erythema and edema on the feet  - numerous erythematous crusted papules on the back  - No other lesions of concern on areas examined.     Medications:  Current Outpatient Medications   Medication     aspirin 81 MG tablet     Biotin 10 MG CAPS     bisoprolol (ZEBETA) 5 MG tablet     butalbital-acetaminophen-caffeine (FIORICET, ESGIC) -40 MG per tablet     Calcium-Magnesium-Zinc 333-133-5 MG TABS     Cholecalciferol (VITAMIN D3 PO)     clobetasol (TEMOVATE) 0.05 % CREA cream     Coenzyme Q10 (CO Q 10 PO)     Cyanocobalamin  (B-12 PO)     fish oil-omega-3 fatty acids 1000 MG capsule     Flaxseed, Linseed, (FLAXSEED OIL PO)     folic acid (FOLVITE) 400 MCG tablet     furosemide (LASIX) 20 MG tablet     GARLIC PO     Ginkgo Biloba (GINKOBA PO)     hydrOXYzine (ATARAX) 25 MG tablet     Lutein 20 MG CAPS     naproxen (NAPROSYN) 125 MG/5ML suspension     omeprazole (PRILOSEC) 20 MG DR capsule     Sodium Hyaluronate (oral) (HYALURONIC ACID) 100 MG CAPS     UNABLE TO FIND     carBAMazepine (TEGRETOL XR) 100 MG 12 hr tablet     COMPRESSION STOCKINGS     cyclobenzaprine (FLEXERIL) 10 MG tablet     GABAPENTIN 8%/VANICREAM, FV COMPOUNDED, CREAM     senna-docusate (SENOKOT-S;PERICOLACE) 8.6-50 MG per tablet     Urea 40 % CREA     zolpidem (AMBIEN) 10 MG tablet     No current facility-administered medications for this visit.      Past Medical/Surgical History:   Patient Active Problem List   Diagnosis     Rectal prolapse     Median neuropathy, left     Median neuropathy, right     Sjogren's syndrome (H)     Hx of bilateral carpal tunnel repair     S/P bilateral vein stripping (1975)     Venous insufficiency     Past Medical History:   Diagnosis Date     Back disorder      History of colonic polyps      Hyperlipidemia      Median neuropathy, 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     Peripheral vascular disease (H)      Rectal prolapse      S/P bilateral vein stripping (1975) 9/2/2015     Sjogren's syndrome (H) 6/24/2015     Venous insufficiency 9/2/2015       CC Chilango Dick MD  [address] [place] [phone] on close of this encounter.

## 2022-11-25 ENCOUNTER — TELEPHONE (OUTPATIENT)
Dept: DERMATOLOGY | Facility: CLINIC | Age: 72
End: 2022-11-25

## 2022-11-25 NOTE — TELEPHONE ENCOUNTER
[facility]. Sent Letter informing patient to call [phone] to schedule a follow up with Dr. Solis  in Dermatology.

## 2022-12-09 NOTE — TELEPHONE ENCOUNTER
RECORDS RECEIVED FROM: Internal   REASON FOR VISIT: Polyneuropathy with allodynia and hyperalgesia, most prominent in the hands and feet   Date of Appt: 03/01/2023   NOTES (FOR ALL VISITS) STATUS DETAILS   OFFICE NOTE from referring provider Internal 09/22/2022 Dr Solis [facility]    OFFICE NOTE from other specialist Internal 07/31/2015 Dr Ruiz [facility]   06/11/2015 [facility]   DISCHARGE SUMMARY from hospital N/A    DISCHARGE REPORT from the ER N/A    OPERATIVE REPORT N/A    MEDICATION LIST N/A    IMAGING  (FOR ALL VISITS)     EMG Received 06/11/2015 11/07/2012    EEG N/A    LUMBAR PUNCTURE N/A    FAMILIA SCAN N/A    ULTRASOUND (CAROTID BILAT) *VASCULAR* N/A    MRI (HEAD, NECK, SPINE) N/A    CT (HEAD, NECK, SPINE) N/A

## 2023-01-12 ENCOUNTER — VIRTUAL VISIT (OUTPATIENT)
Dept: DERMATOLOGY | Facility: CLINIC | Age: 73
End: 2023-01-12
Payer: MEDICARE

## 2023-01-12 DIAGNOSIS — M19.90 INFLAMMATORY ARTHRITIS: Primary | ICD-10-CM

## 2023-01-12 DIAGNOSIS — I73.81 ERYTHROMELALGIA (H): ICD-10-CM

## 2023-01-12 DIAGNOSIS — G62.9 NEUROPATHY: ICD-10-CM

## 2023-01-12 PROCEDURE — 99215 OFFICE O/P EST HI 40 MIN: CPT | Mod: 95 | Performed by: DERMATOLOGY

## 2023-01-12 ASSESSMENT — PATIENT HEALTH QUESTIONNAIRE - PHQ9: SUM OF ALL RESPONSES TO PHQ QUESTIONS 1-9: 20

## 2023-01-12 NOTE — LETTER
1/12/2023       RE: Anjali Chery  [address] [place] [address]     Dear Colleague,    Thank you for referring your patient, Anjali Chery, to the [facility] DERMATOLOGY [facility] [place] at [facility]. Please see a copy of my visit note below.    [facility] Dermatology Note  Encounter Date: Jan 12, 2023  Telephone ([phone]). Location of teledermatologist: [facility]ERMATOLOGY [facility] [place]. Start time: 4:14. End time: 4:49.    Dermatology Problem List:  1. Pain and redness of feet and dorsal hands:  - Unclear etiology; ddx includes small fiber neuropathy, complex regional pain syndrome, erythromelalgia   - Initial onset ~2013  - Current tx: naproxen, oxycodone, clobetasol 0.05% ointment, CeraVe with pramoxine, aloe vera, gold bond with urea, CBD  - Past tx: triamcinolone 0.1% ointment, mometasone, pregabalin, gabapentin, duloxetine, amitriptyline, 40% urea cream, topical gabapentin, prescribed carbamazepine but didn't take (concern for side effects)  - Future: repeat biopsy/EMG      2. Suspected Sjogren syndrome with history of inflammatory arthritis and chronic pain  - Follows rheum (first seen ~1999)  - Past tx: ?methotrexate (~2014, but may not have taken this), prednisone, hydroxychloroquine (several years, stopped due to lack of efficacy and also nausea)    ____________________________________________    Assessment & Plan:     1. Neuropathic symptoms: DDx erythromelalgia vs small fiber neuropathy. Difficult history. Initially stated no significant changes in symptoms, however later reports some improvement in hand erythema and reduction in swelling. Saw outside pain medicine and started on CBD, naltrexone, zolpidem, which have been helpful. Waiting to see neurology (scheduled for March 2023) for evaluation and potential repeat EMG. Also going to follow up for second opinion in  erythromelalgia clinic at [place] with Dr. Maurer.  - continue pain management  - upcoming neurology and erythromelalgia clinic evaluations; potentially repeat EMG    2. Inflammatory arthritis: photos remain concerning for inflammatory arthritis; distribution consistent with rheumatoid arthritis with significant MCP involvement. Patient reports she was never on methotrexate - prior records were transcribed in error as this was actually given to her brother for eosinophilic fasciitis. Prior notes in Care Everywhere do mention treatment with methotrexate ca 2014-15, so unclear how to interpret this. In any case, will check plain films. We did discuss treatment with methotrexate vs TNF inhibitor given severity of clinically apparent changes. Patient defers further treatment until completion of xrays.  - plain films    Procedures Performed:    None    Follow-up: 3 months    Staff:     Over 45 minutes was spent during this visit on the date of service, including direct contact time with the patient counseling and coordinating care, review of the medical records, and documentation of diagnosis, natural history, treatment, and prognosis. This excludes time performing any relevant procedures.      Chandan Solis MD, FAAD   of Dermatology  Department of Dermatology  [facility] [employer] [facility]    ____________________________________________    CC: Derm Problem (Erythromelalgia/small fiber neuropathy - concerns today she has been having more pain question about pain medications)    HPI:  Ms. Anjali Chery is a(n) 73 year old female who presents today as a return patient for neuropathy    Neuropathy - hands more involved since last visit  - on Monday - wore nitrile gloves - swelling of right fifth PIP  - multiple joints on hands are swollen - per chart had been on methotrexate ~2014 but patient denies that she took this  - brother has eosinophilic fasciitis and was treated with  methotrexate - this may have been where chart confusion came from     Triamcinolone lotion - hasn't started using this due to concerns about using on broken skin    Started CBD treatment for pain - sleeping a bit better  - also started taking zolpidem again  - started naltrexone 2.25 mg daily per pain medicine as well    Patient is otherwise feeling well, without additional skin concerns.    Labs Reviewed:  N/A    Physical Exam:  Vitals: There were no vitals taken for this visit.  SKIN: Teledermatology photos were reviewed; image quality and interpretability: acceptable. Image date: 1/12/23.  - deep red erythema and hyperkeratosis on the feet > hands  - No other lesions of concern on areas examined.     Medications:  Current Outpatient Medications   Medication     aspirin 81 MG tablet     Biotin 10 MG CAPS     bisoprolol (ZEBETA) 5 MG tablet     butalbital-acetaminophen-caffeine (FIORICET, ESGIC) -40 MG per tablet     Calcium-Magnesium-Zinc 333-133-5 MG TABS     Cholecalciferol (VITAMIN D3 PO)     Coenzyme Q10 (CO Q 10 PO)     Cyanocobalamin (B-12 PO)     fish oil-omega-3 fatty acids 1000 MG capsule     Flaxseed, Linseed, (FLAXSEED OIL PO)     folic acid (FOLVITE) 400 MCG tablet     furosemide (LASIX) 20 MG tablet     GARLIC PO     Ginkgo Biloba (GINKOBA PO)     hydrOXYzine (ATARAX) 25 MG tablet     Lutein 20 MG CAPS     naproxen (NAPROSYN) 125 MG/5ML suspension     omeprazole (PRILOSEC) 20 MG DR capsule     Sodium Hyaluronate (oral) (HYALURONIC ACID) 100 MG CAPS     triamcinolone (KENALOG) 0.1 % external lotion     carBAMazepine (TEGRETOL XR) 100 MG 12 hr tablet     clobetasol (TEMOVATE) 0.05 % CREA cream     COMPRESSION STOCKINGS     cyclobenzaprine (FLEXERIL) 10 MG tablet     GABAPENTIN 8%/VANICREAM, FV COMPOUNDED, CREAM     medical cannabis (Patient's own supply)     Rimegepant Sulfate 75 MG TBDP     senna-docusate (SENOKOT-S;PERICOLACE) 8.6-50 MG per tablet     UNABLE TO FIND     Urea 40 % CREA      zolpidem (AMBIEN) 10 MG tablet     No current facility-administered medications for this visit.      Past Medical/Surgical History:   Patient Active Problem List   Diagnosis     Rectal prolapse     Median neuropathy, left     Median neuropathy, right     Sjogren's syndrome (H)     Hx of bilateral carpal tunnel repair     S/P bilateral vein stripping (1975)     Venous insufficiency     Past Medical History:   Diagnosis Date     Back disorder      History of colonic polyps      Hyperlipidemia      Median neuropathy, 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     Peripheral vascular disease (H)      Rectal prolapse      S/P bilateral vein stripping (1975) 9/2/2015     Sjogren's syndrome (H) 6/24/2015     Venous insufficiency 9/2/2015       CC Referred Self, MD  No address on file on close of this encounter.

## 2023-01-12 NOTE — PROGRESS NOTES
[facility] Dermatology Note  Encounter Date: Jan 12, 2023  Telephone ([phone]). Location of teledermatologist: [facility] [place]. Start time: 4:14. End time: 4:49.    Dermatology Problem List:  1. Pain and redness of feet and dorsal hands:  - Unclear etiology; ddx includes small fiber neuropathy, complex regional pain syndrome, erythromelalgia   - Initial onset ~2013  - Current tx: naproxen, oxycodone, clobetasol 0.05% ointment, CeraVe with pramoxine, aloe vera, gold bond with urea, CBD  - Past tx: triamcinolone 0.1% ointment, mometasone, pregabalin, gabapentin, duloxetine, amitriptyline, 40% urea cream, topical gabapentin, prescribed carbamazepine but didn't take (concern for side effects)  - Future: repeat biopsy/EMG      2. Suspected Sjogren syndrome with history of inflammatory arthritis and chronic pain  - Follows rheum (first seen ~1999)  - Past tx: ?methotrexate (~2014, but may not have taken this), prednisone, hydroxychloroquine (several years, stopped due to lack of efficacy and also nausea)    ____________________________________________    Assessment & Plan:     1. Neuropathic symptoms: DDx erythromelalgia vs small fiber neuropathy. Difficult history. Initially stated no significant changes in symptoms, however later reports some improvement in hand erythema and reduction in swelling. Saw outside pain medicine and started on CBD, naltrexone, zolpidem, which have been helpful. Waiting to see neurology (scheduled for March 2023) for evaluation and potential repeat EMG. Also going to follow up for second opinion in erythromelalgia clinic at [place] with Dr. Maurer.  - continue pain management  - upcoming neurology and erythromelalgia clinic evaluations; potentially repeat EMG    2. Inflammatory arthritis: photos remain concerning for inflammatory arthritis; distribution consistent with rheumatoid arthritis with significant MCP involvement. Patient reports  she was never on methotrexate - prior records were transcribed in error as this was actually given to her brother for eosinophilic fasciitis. Prior notes in Care Everywhere do mention treatment with methotrexate ca 2014-15, so unclear how to interpret this. In any case, will check plain films. We did discuss treatment with methotrexate vs TNF inhibitor given severity of clinically apparent changes. Patient defers further treatment until completion of xrays.  - plain films    Procedures Performed:    None    Follow-up: 3 months    Staff:     Over 45 minutes was spent during this visit on the date of service, including direct contact time with the patient counseling and coordinating care, review of the medical records, and documentation of diagnosis, natural history, treatment, and prognosis. This excludes time performing any relevant procedures.      Chandan Solis MD, FAAD   of Dermatology  Department of Dermatology  [facility]    ____________________________________________    CC: Derm Problem (Erythromelalgia/small fiber neuropathy - concerns today she has been having more pain question about pain medications)    HPI:  Ms. Anjali Chery is a(n) 73 year old female who presents today as a return patient for neuropathy    Neuropathy - hands more involved since last visit  - on Monday - wore nitrile gloves - swelling of right fifth PIP  - multiple joints on hands are swollen - per chart had been on methotrexate ~2014 but patient denies that she took this  - brother has eosinophilic fasciitis and was treated with methotrexate - this may have been where chart confusion came from     Triamcinolone lotion - hasn't started using this due to concerns about using on broken skin    Started CBD treatment for pain - sleeping a bit better  - also started taking zolpidem again  - started naltrexone 2.25 mg daily per pain medicine as well    Patient is otherwise feeling  well, without additional skin concerns.    Labs Reviewed:  N/A    Physical Exam:  Vitals: There were no vitals taken for this visit.  SKIN: Teledermatology photos were reviewed; image quality and interpretability: acceptable. Image date: 1/12/23.  - deep red erythema and hyperkeratosis on the feet > hands  - No other lesions of concern on areas examined.     Medications:  Current Outpatient Medications   Medication     aspirin 81 MG tablet     Biotin 10 MG CAPS     bisoprolol (ZEBETA) 5 MG tablet     butalbital-acetaminophen-caffeine (FIORICET, ESGIC) -40 MG per tablet     Calcium-Magnesium-Zinc 333-133-5 MG TABS     Cholecalciferol (VITAMIN D3 PO)     Coenzyme Q10 (CO Q 10 PO)     Cyanocobalamin (B-12 PO)     fish oil-omega-3 fatty acids 1000 MG capsule     Flaxseed, Linseed, (FLAXSEED OIL PO)     folic acid (FOLVITE) 400 MCG tablet     furosemide (LASIX) 20 MG tablet     GARLIC PO     Ginkgo Biloba (GINKOBA PO)     hydrOXYzine (ATARAX) 25 MG tablet     Lutein 20 MG CAPS     naproxen (NAPROSYN) 125 MG/5ML suspension     omeprazole (PRILOSEC) 20 MG DR capsule     Sodium Hyaluronate (oral) (HYALURONIC ACID) 100 MG CAPS     triamcinolone (KENALOG) 0.1 % external lotion     carBAMazepine (TEGRETOL XR) 100 MG 12 hr tablet     clobetasol (TEMOVATE) 0.05 % CREA cream     COMPRESSION STOCKINGS     cyclobenzaprine (FLEXERIL) 10 MG tablet     GABAPENTIN 8%/VANICREAM, FV COMPOUNDED, CREAM     medical cannabis (Patient's own supply)     Rimegepant Sulfate 75 MG TBDP     senna-docusate (SENOKOT-S;PERICOLACE) 8.6-50 MG per tablet     UNABLE TO FIND     Urea 40 % CREA     zolpidem (AMBIEN) 10 MG tablet     No current facility-administered medications for this visit.      Past Medical/Surgical History:   Patient Active Problem List   Diagnosis     Rectal prolapse     Median neuropathy, left     Median neuropathy, right     Sjogren's syndrome (H)     Hx of bilateral carpal tunnel repair     S/P bilateral vein stripping (1975)      Venous insufficiency     Past Medical History:   Diagnosis Date     Back disorder      History of colonic polyps      Hyperlipidemia      Median neuropathy, 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     Peripheral vascular disease (H)      Rectal prolapse      S/P bilateral vein stripping (1975) 9/2/2015     Sjogren's syndrome (H) 6/24/2015     Venous insufficiency 9/2/2015       CC Referred Self, MD  No address on file on close of this encounter.

## 2023-01-12 NOTE — NURSING NOTE
Chief Complaint   Patient presents with     Derm Problem     Erythromelalgia/small fiber neuropathy   Teledermatology Nurse Call Patients:     Are you in the [facility] at the time of the encounter? yes    Today's visit will be billed to you and your insurance.    A teledermatology visit is not as thorough as an in-person visit and the quality of the photograph sent may not be of the same quality as that taken by the dermatology clinic.    Annabella Swenson,   Virtual Visit Facilitator

## 2023-01-13 ENCOUNTER — TELEPHONE (OUTPATIENT)
Dept: DERMATOLOGY | Facility: CLINIC | Age: 73
End: 2023-01-13

## 2023-01-13 NOTE — TELEPHONE ENCOUNTER
Attempted to reach patient to schedule their follow-up with Dermatology.   I left the patient a voicemail to call the clinic for scheduling.     Annabella Swenson, Virtual Visit Facilitator

## 2023-02-20 ENCOUNTER — TELEPHONE (OUTPATIENT)
Dept: DERMATOLOGY | Facility: CLINIC | Age: 73
End: 2023-02-20

## 2023-02-20 NOTE — TELEPHONE ENCOUNTER
[facility] Call Center    Phone Message    May a detailed message be left on voicemail: yes     Reason for Call: Other: Pts  states she has an appt with Neuro coming up 3/1/2023. Pt did have a EMG done at the [facility] within the last two weeks. Pts  is wondering if she still needs to see Neuro. Please call Pts  Moshe to discuss, thanks!      Action Taken: Message routed to:  [facility] derm    Travel Screening: Not Applicable

## 2023-02-24 NOTE — TELEPHONE ENCOUNTER
Writer called patient's  back and relayed following information from Dr. Solis listed below, left voicemail for patient with callback number if patient or patient's  has any other questions.    Luciana ADAMS RN

## 2023-02-24 NOTE — TELEPHONE ENCOUNTER
I would leave it to their discretion - I'm not able to view the results of the EMG or the [place] neurology notes, so I'm not sure what their opinions were. In a case as challenging as hers, getting another set of eyes on her symptoms may not be a bad idea. Thanks!

## 2023-03-01 ENCOUNTER — PRE VISIT (OUTPATIENT)
Dept: NEUROLOGY | Facility: CLINIC | Age: 73
End: 2023-03-01

## 2025-05-01 NOTE — LETTER
6/30/2022       RE: Anjali Chery  [address] [place] [address]     Dear Colleague,    Thank you for referring your patient, Anjali Chery, to the [facility] [place] at [facility]. Please see a copy of my visit note below.    [facility] Dermatology Note  Encounter Date: Jun 30, 2022  [employer] and Telephone ([phone]). Location of teledermatologist: [facility] [place].  Start time: 2:59. End time: 3:30.    Dermatology Problem List:  1. Pain and redness of feet and dorsal hands:  - Unclear etiology; ddx includes small fiber neuropathy, complex regional pain syndrome, erythromelalgia  - Initial onset ~2013  - Current tx: duloxetine 30 mg (plan to increase to 60 mg), amitriptyline 12.5 mg nightly, CeraVe with pramoxine, aloe vera  - Past tx: triamcinolone 0.1% ointment, mometasone, pregabalin, gabapentin  - Future: repeat biopsy/EM      2. Suspected Sjogren syndrome with history of inflammatory arthritis and chronic pain  - Follows rheum (first seen ~1999)  - Past tx: MTX (~2014), prednisone, Plaquenil (several years, stopped due to lack of efficacy and also nausea)       ____________________________________________    Assessment & Plan:     1. 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 We discussed addition of misoprostol and repeat biopsy; patient defers for now until in-person visit in 3 weeks. Also on low-dose amitriptyline. Will check additional labs given number of OTC supplements and past elevated B12 levels, as well as potential for heavy metal poisoning with lead and mercury.  - flow cytometry, peripheral smear, CBC, B12, B6, lead, mercury  - continue duloxetine uptitration schedule  - plan for misoprostol 200 mcg BID at next visit      Procedures Performed:    None    Follow-up: 3  weeks as scheduled    Staff:     Over 44 minutes was spent during this visit on the date of service, including direct contact time with the patient counseling and coordinating care including the discussion and documentation of diagnosis, natural history, treatment, and prognosis.      Chandan Solis MD, FAAD   of Dermatology  Department of Dermatology  [facility]    ____________________________________________    CC: Derm Problem (Follow up on neuropathy, states that it is getting worse)    HPI:  Ms. Anjali Chery is a(n) 72 year old female who presents today as a return patient for neuropathy    Neuropathy - pain seems to be progressing - more pain in hands than prior  - feet and legs also hurt - still getting flushing  - pain - burns  - dry cloth touching the areas tends to exacerbate symptoms  - if shiny/smooth, not as painful  - at last visit - stopped to fluoxetine and switched to duloxetine - started this 4 days ago   - tolerating okay at this time   - now having pasty bowel movements - change in texture but not color  - also on amitriptyline 1/2 tablet  - feet and legs also swell independent of redness  - getting swelling in hands  - flushing of face & ears  - takes aspirin 81 mg BID, and number      Patient is otherwise feeling well, without additional skin concerns.    Labs Reviewed:  N/A    Physical Exam:  Vitals: There were no vitals taken for this visit.  SKIN: no photos    Medications:  Current Outpatient Medications   Medication     AMITRIPTYLINE HCL PO     aspirin 81 MG tablet     baclofen (LIORESAL) 20 MG tablet     butalbital-acetaminophen-caffeine (FIORICET, ESGIC) -40 MG per tablet     Calcium-Magnesium-Zinc 333-133-5 MG TABS     Cholecalciferol (VITAMIN D3 PO)     Coenzyme Q10 (CO Q 10 PO)     COMPRESSION STOCKINGS     Cyanocobalamin (B-12 PO)     cyclobenzaprine (FLEXERIL) 10 MG tablet     DULoxetine (CYMBALTA) 30 MG capsule      fish oil-omega-3 fatty acids 1000 MG capsule     Flaxseed, Linseed, (FLAXSEED OIL PO)     FLUoxetine (PROZAC) 10 MG capsule     folic acid (FOLVITE) 400 MCG tablet     gabapentin (NEURONTIN) 100 MG capsule     gabapentin (NEURONTIN) 300 MG capsule     GARLIC PO     Lutein 20 MG CAPS     senna-docusate (SENOKOT-S;PERICOLACE) 8.6-50 MG per tablet     UNABLE TO FIND     Urea 40 % CREA     zolpidem (AMBIEN) 10 MG tablet     No current facility-administered medications for this visit.      Past Medical/Surgical History:   Patient Active Problem List   Diagnosis     Rectal prolapse     Median neuropathy, left     Median neuropathy, right     Sjogren's syndrome (H)     Hx of bilateral carpal tunnel repair     S/P bilateral vein stripping (1975)     Venous insufficiency     Past Medical History:   Diagnosis Date     Back disorder      History of colonic polyps      Hyperlipidemia      Median neuropathy, 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     Peripheral vascular disease (H)      Rectal prolapse      S/P bilateral vein stripping (1975) 9/2/2015     Sjogren's syndrome (H) 6/24/2015     Venous insufficiency 9/2/2015       CC Manuelito Simpson  [address] [place] [address] on close of this encounter.       No

## 2025-08-26 ENCOUNTER — MEDICAL CORRESPONDENCE (OUTPATIENT)
Dept: HEALTH INFORMATION MANAGEMENT | Facility: CLINIC | Age: 75
End: 2025-08-26
Payer: MEDICARE

## 2025-08-27 ENCOUNTER — TRANSCRIBE ORDERS (OUTPATIENT)
Dept: OTHER | Age: 75
End: 2025-08-27

## 2025-08-27 DIAGNOSIS — Z12.12 ENCOUNTER FOR COLORECTAL CANCER SCREENING: ICD-10-CM

## 2025-08-27 DIAGNOSIS — Z86.0101 HX OF ADENOMATOUS COLONIC POLYPS: Primary | ICD-10-CM

## 2025-08-27 DIAGNOSIS — Z12.11 ENCOUNTER FOR COLORECTAL CANCER SCREENING: ICD-10-CM

## 2025-08-28 ENCOUNTER — PATIENT OUTREACH (OUTPATIENT)
Dept: CARE COORDINATION | Facility: CLINIC | Age: 75
End: 2025-08-28
Payer: MEDICARE